# Patient Record
Sex: FEMALE | Race: WHITE | NOT HISPANIC OR LATINO | Employment: FULL TIME | URBAN - METROPOLITAN AREA
[De-identification: names, ages, dates, MRNs, and addresses within clinical notes are randomized per-mention and may not be internally consistent; named-entity substitution may affect disease eponyms.]

---

## 2017-07-18 ENCOUNTER — HOSPITAL ENCOUNTER (OUTPATIENT)
Dept: OTHER | Facility: HOSPITAL | Age: 32
Discharge: HOME | End: 2017-07-18
Attending: NURSE PRACTITIONER | Admitting: NURSE PRACTITIONER

## 2018-05-24 ENCOUNTER — CONVERSION ENCOUNTER (OUTPATIENT)
Dept: FAMILY MEDICINE CLINIC | Facility: CLINIC | Age: 33
End: 2018-05-24

## 2018-05-24 LAB
BASOPHILS # BLD AUTO: NORMAL 10*3/MM3 (ref 0–200)
BASOPHILS NFR BLD AUTO: 0.5 %
CONV NEUTROPHILS/100 LEUKOCYTES IN BODY FLUID BY MANUAL COUNT: 63.4 %
CRP SERPL-MCNC: 0.32 MG/DL
DSDNA AB SER-ACNC: 1 [IU]/ML
EOSINOPHIL # BLD AUTO: 0.7 %
EOSINOPHIL # BLD AUTO: NORMAL 10*3/MM3 (ref 15–500)
ERYTHROCYTE [DISTWIDTH] IN BLOOD BY AUTOMATED COUNT: 12.9 % (ref 11–15)
HCT VFR BLD AUTO: 42.3 % (ref 35–45)
HGB BLD-MCNC: 14.2 G/DL (ref 11.7–15.5)
LYMPHOCYTES # BLD AUTO: NORMAL 10*3/MM3 (ref 850–3900)
LYMPHOCYTES NFR BLD AUTO: 28.6 %
MCH RBC QN AUTO: 29.3 PG (ref 27–33)
MCHC RBC AUTO-ENTMCNC: NORMAL % (ref 32–36)
MCV RBC AUTO: 87.4 FL (ref 80–100)
MONOCYTES # BLD AUTO: NORMAL 10*3/MICROLITER (ref 200–950)
MONOCYTES NFR BLD AUTO: 6.8 %
NEUTROPHILS # BLD AUTO: NORMAL 10*3/MM3 (ref 1500–7800)
PLATELET # BLD AUTO: NORMAL 10*3/MM3 (ref 140–400)
PMV BLD AUTO: 11.3 FL (ref 7.5–12.5)
RBC # BLD AUTO: NORMAL 10*6/MM3 (ref 3.8–5.1)
WBC # BLD AUTO: NORMAL K/UL (ref 3.8–10.8)

## 2018-11-08 ENCOUNTER — HOSPITAL ENCOUNTER (OUTPATIENT)
Dept: OTHER | Facility: HOSPITAL | Age: 33
Discharge: HOME | End: 2018-11-08
Attending: NURSE PRACTITIONER | Admitting: NURSE PRACTITIONER

## 2018-11-08 LAB
ALBUMIN SERPL-MCNC: 4.8 G/DL (ref 3.6–5.1)
ALBUMIN/GLOB SERPL: NORMAL {RATIO} (ref 1–2.5)
ALP SERPL-CCNC: 41 UNITS/L (ref 33–115)
ALT SERPL-CCNC: 7 UNITS/L (ref 6–29)
AST SERPL-CCNC: 12 UNITS/L (ref 10–30)
BASOPHILS # BLD AUTO: NORMAL 10*3/MM3 (ref 0–200)
BASOPHILS NFR BLD AUTO: 0.6 %
BILIRUB SERPL-MCNC: 1.1 MG/DL (ref 0.2–1.2)
BUN SERPL-MCNC: 10 MG/DL (ref 7–25)
BUN/CREAT SERPL: NORMAL (ref 6–22)
CALCIUM SERPL-MCNC: 9.9 MG/DL (ref 8.6–10.2)
CHLORIDE SERPL-SCNC: 106 MMOL/L (ref 98–110)
CO2 CONTENT VENOUS: 28 MMOL/L (ref 20–32)
CONV NEUTROPHILS/100 LEUKOCYTES IN BODY FLUID BY MANUAL COUNT: 60.1 %
CONV RF TITER: <14
CONV TOTAL PROTEIN: 7.3 G/DL (ref 6.1–8.1)
CREAT UR-MCNC: 0.58 MG/DL (ref 0.5–1.1)
CRP SERPL-MCNC: 0.05 MG/DL
EOSINOPHIL # BLD AUTO: 0.7 %
EOSINOPHIL # BLD AUTO: NORMAL 10*3/MM3 (ref 15–500)
ERYTHROCYTE [DISTWIDTH] IN BLOOD BY AUTOMATED COUNT: 12.6 % (ref 11–15)
ERYTHROCYTE [SEDIMENTATION RATE] IN BLOOD BY WESTERGREN METHOD: 2 MM/HR
GLOBULIN UR ELPH-MCNC: NORMAL G/DL (ref 1.9–3.7)
GLUCOSE SERPL-MCNC: 84 MG/DL (ref 65–99)
HCT VFR BLD AUTO: 41.3 % (ref 35–45)
HGB BLD-MCNC: 14 G/DL (ref 11.7–15.5)
LYMPHOCYTES # BLD AUTO: NORMAL 10*3/MM3 (ref 850–3900)
LYMPHOCYTES NFR BLD AUTO: 32.1 %
MCH RBC QN AUTO: 29.7 PG (ref 27–33)
MCHC RBC AUTO-ENTMCNC: NORMAL % (ref 32–36)
MCV RBC AUTO: 87.5 FL (ref 80–100)
MONOCYTES # BLD AUTO: NORMAL 10*3/MICROLITER (ref 200–950)
MONOCYTES NFR BLD AUTO: 6.5 %
NEUTROPHILS # BLD AUTO: NORMAL 10*3/MM3 (ref 1500–7800)
PLATELET # BLD AUTO: NORMAL 10*3/MM3 (ref 140–400)
PMV BLD AUTO: 11.8 FL (ref 7.5–12.5)
POTASSIUM SERPL-SCNC: 4.1 MMOL/L (ref 3.5–5.3)
RBC # BLD AUTO: NORMAL 10*6/MM3 (ref 3.8–5.1)
SODIUM SERPL-SCNC: 138 MMOL/L (ref 135–146)
TSH SERPL-ACNC: 1.01 MICROINTL UNITS/ML
WBC # BLD AUTO: NORMAL K/UL (ref 3.8–10.8)

## 2019-06-24 RX ORDER — ESCITALOPRAM OXALATE 10 MG/1
TABLET ORAL
Qty: 30 TABLET | Refills: 0 | Status: SHIPPED | OUTPATIENT
Start: 2019-06-24 | End: 2019-09-13 | Stop reason: SDUPTHER

## 2019-09-12 PROBLEM — F41.9 ANXIETY: Status: ACTIVE | Noted: 2019-09-12

## 2019-09-12 PROBLEM — R76.8 ELEVATED ANTINUCLEAR ANTIBODY (ANA) LEVEL: Status: ACTIVE | Noted: 2018-11-08

## 2019-09-12 PROBLEM — G62.9 PERIPHERAL NEUROPATHY: Status: ACTIVE | Noted: 2017-07-18

## 2019-09-12 PROBLEM — F17.200 TOBACCO USE DISORDER: Status: ACTIVE | Noted: 2019-09-12

## 2019-09-12 RX ORDER — CITALOPRAM 40 MG/1
TABLET ORAL DAILY
COMMUNITY
Start: 2015-03-23 | End: 2019-09-13

## 2019-09-12 RX ORDER — ALPRAZOLAM 0.25 MG/1
TABLET ORAL
COMMUNITY
Start: 2014-06-18 | End: 2019-09-13

## 2019-09-13 ENCOUNTER — OFFICE VISIT (OUTPATIENT)
Dept: FAMILY MEDICINE CLINIC | Facility: CLINIC | Age: 34
End: 2019-09-13

## 2019-09-13 VITALS
DIASTOLIC BLOOD PRESSURE: 76 MMHG | WEIGHT: 147.4 LBS | TEMPERATURE: 98.4 F | HEART RATE: 74 BPM | HEIGHT: 67 IN | BODY MASS INDEX: 23.13 KG/M2 | SYSTOLIC BLOOD PRESSURE: 119 MMHG | RESPIRATION RATE: 18 BRPM | OXYGEN SATURATION: 99 %

## 2019-09-13 DIAGNOSIS — F41.9 ANXIETY: ICD-10-CM

## 2019-09-13 DIAGNOSIS — E01.0 THYROMEGALY: ICD-10-CM

## 2019-09-13 DIAGNOSIS — N92.6 MENSTRUAL IRREGULARITY: Primary | ICD-10-CM

## 2019-09-13 PROCEDURE — 99214 OFFICE O/P EST MOD 30 MIN: CPT | Performed by: NURSE PRACTITIONER

## 2019-09-13 RX ORDER — ESCITALOPRAM OXALATE 10 MG/1
10 TABLET ORAL DAILY
Qty: 90 TABLET | Refills: 0 | Status: SHIPPED | OUTPATIENT
Start: 2019-09-13 | End: 2019-12-03 | Stop reason: SDUPTHER

## 2019-09-13 RX ORDER — BUSPIRONE HYDROCHLORIDE 5 MG/1
5 TABLET ORAL 3 TIMES DAILY PRN
Qty: 90 TABLET | Refills: 0 | Status: SHIPPED | OUTPATIENT
Start: 2019-09-13 | End: 2019-12-03 | Stop reason: SDUPTHER

## 2019-09-13 NOTE — PROGRESS NOTES
"Chief Complaint   Patient presents with   • Menstrual Problem     having multiple periods a month.    • Depression        Subjective     Wanda Doe  has a past medical history of Bipolar 2 disorder (CMS/HCC), Chronic constipation, Generalized anxiety disorder, Genital herpes, Genital warts, Gestational diabetes, HPV in female, Hyperbilirubinemia, LGSIL on Pap smear of cervix, and Migraine headache.    HPI  Having irregular periods. Has 7 days of bleeding, 4-5 days off, then another 8 days of bleeding, then 7 days off then will start this over again. This has been happening since June 2019. Occasional light spotting on the off days. The days she is bleeding it is like a regular period - her normal flow. Has irritability, stress, breast tenderness, cramping, \"PMS\" symptoms prior to bleeding. Rarely passes a clot.  Prior to June had a normal monthly period that lasted 5-7 days.     Having occasional anxiety & depressive symptoms. She wonders if it is just because she is feeling stressed from her menstrual irregularity. She has been on Buspar in the past and it helped, would like to try it again if possible. Has been on Lexapro since May 2019 - it has helped. Has missed a dose occasionally.     PHQ-2 Depression Screening  Little interest or pleasure in doing things? 3   Feeling down, depressed, or hopeless? 3   PHQ-2 Total Score 17   PHQ-9 Depression Screening  Little interest or pleasure in doing things? 3   Feeling down, depressed, or hopeless? 3   Trouble falling or staying asleep, or sleeping too much? 3   Feeling tired or having little energy? 3   Poor appetite or overeating? 0   Feeling bad about yourself - or that you are a failure or have let yourself or your family down? 0   Trouble concentrating on things, such as reading the newspaper or watching television? 3   Moving or speaking so slowly that other people could have noticed? Or the opposite - being so fidgety or restless that you have been moving " around a lot more than usual? 2   Thoughts that you would be better off dead, or of hurting yourself in some way? 0   PHQ-9 Total Score 17   If you checked off any problems, how difficult have these problems made it for you to do your work, take care of things at home, or get along with other people? Extremely dIfficult     No Known Allergies      Current Outpatient Medications:   •  escitalopram (LEXAPRO) 10 MG tablet, Take 1 tablet by mouth Daily., Disp: 90 tablet, Rfl: 0  •  linaclotide (LINZESS) 145 MCG capsule capsule, LINZESS 145 MCG CAPS, Disp: , Rfl:   •  busPIRone (BUSPAR) 5 MG tablet, Take 1 tablet by mouth 3 (Three) Times a Day As Needed (Anxiety)., Disp: 90 tablet, Rfl: 0    Past Medical History:   Diagnosis Date   • Bipolar 2 disorder (CMS/HCC)    • Chronic constipation    • Generalized anxiety disorder    • Genital herpes    • Genital warts    • Gestational diabetes    • HPV in female    • Hyperbilirubinemia    • LGSIL on Pap smear of cervix    • Migraine headache        Past Surgical History:   Procedure Laterality Date   • COLPOSCOPY     • DILATATION AND CURETTAGE         Social History     Socioeconomic History   • Marital status:      Spouse name: Not on file   • Number of children: Not on file   • Years of education: Not on file   • Highest education level: Not on file   Tobacco Use   • Smoking status: Former Smoker   • Smokeless tobacco: Never Used   Substance and Sexual Activity   • Alcohol use: No     Frequency: Never   • Drug use: No       History reviewed. No pertinent family history.    Family history, surgical history, past medical history, Allergies and med's reviewed with patient today and updated in [facility] EMR.     ROS:  Review of Systems   Constitutional: Positive for fatigue. Negative for activity change, appetite change, diaphoresis, unexpected weight gain and unexpected weight loss.   Eyes: Negative for blurred vision and visual disturbance.   Respiratory: Negative for cough,  "shortness of breath and wheezing.    Cardiovascular: Negative for chest pain, palpitations and leg swelling.   Gastrointestinal: Negative for abdominal pain, constipation, diarrhea, nausea and vomiting.   Endocrine: Negative for cold intolerance and heat intolerance.   Genitourinary: Positive for menstrual problem. Negative for frequency.   Musculoskeletal: Negative for myalgias.   Neurological: Positive for headache. Negative for dizziness and syncope.   Psychiatric/Behavioral: Positive for depressed mood. Negative for self-injury and suicidal ideas.       OBJECTIVE:  Vitals:    09/13/19 0822   BP: 119/76   BP Location: Right arm   Patient Position: Sitting   Cuff Size: Adult   Pulse: 74   Resp: 18   Temp: 98.4 °F (36.9 °C)   TempSrc: Oral   SpO2: 99%   Weight: 66.9 kg (147 lb 6.4 oz)   Height: 169.5 cm (66.75\")     Body mass index is 23.26 kg/m².  Patient's last menstrual period was 09/05/2019.    Physical Exam   Constitutional: She is oriented to person, place, and time. She appears well-developed and well-nourished.   Neck: Trachea normal and normal range of motion. Neck supple. Carotid bruit is not present. Thyromegaly present.   Cardiovascular: Normal rate, regular rhythm, normal heart sounds and intact distal pulses. Exam reveals no gallop and no friction rub.   No murmur heard.  Pulmonary/Chest: Effort normal and breath sounds normal. She has no wheezes. She has no rales.   Abdominal: Soft. Bowel sounds are normal. There is no hepatosplenomegaly. No hernia.   Genitourinary: Rectum normal, vagina normal and uterus normal. Pelvic exam was performed with patient prone. There is no rash, tenderness, lesion or injury on the right labia. There is no rash, tenderness, lesion or injury on the left labia. Cervix exhibits no motion tenderness and no discharge. Right adnexum displays no mass, no tenderness and no fullness. Left adnexum displays no mass, no tenderness and no fullness.   Genitourinary Comments: Scant " amount of brown discharge   Musculoskeletal: Normal range of motion. She exhibits no edema.   Lymphadenopathy:     She has no cervical adenopathy. No inguinal adenopathy noted on the right or left side.   Neurological: She is alert and oriented to person, place, and time.   Skin: Skin is warm and dry.   Psychiatric: She has a normal mood and affect. Her behavior is normal. Judgment and thought content normal.       No visits with results within 30 Day(s) from this visit.   Latest known visit with results is:   Conversion Encounter on 05/24/2018   Component Date Value Ref Range Status   • ds DNA Antibody 05/24/2018 1   Final   • Basophils Absolute 05/24/2018 28 CELLS/UL  0 - 200 10*3/mm3 Final   • Basophil Rel % 05/24/2018 0.5  % Final   • C-Reactive Protein 05/24/2018 0.32  Units converted. See lab report for original value. mg/dL Final   • Eosinophils Absolute 05/24/2018 39 CELLS/UL  15 - 500 10*3/mm3 Final   • Eosinophil Rel % 05/24/2018 0.7  % Final   • Hematocrit 05/24/2018 42.3  35.0 - 45.0 % Final   • Hemoglobin 05/24/2018 14.2  11.7 - 15.5 g/dL Final   • Lymphocytes Absolute 05/24/2018 1573 CELLS/UL  850 - 3900 10*3/mm3 Final   • Lymphocyte Rel % 05/24/2018 28.6  % Final   • MCH 05/24/2018 29.3  27.0 - 33.0 pg Final   • MCHC 05/24/2018 33.6 G/DL  32.0 - 36.0 % Final   • MCV 05/24/2018 87.4  80.0 - 100.0 fL Final   • Monocyte Rel % 05/24/2018 6.8  % Final   • Monocytes Absolute 05/24/2018 374 CELLS/UL  200 - 950 10*3/microliter Final   • MPV 05/24/2018 11.3  7.5 - 12.5 fL Final   • Neutrophils Absolute 05/24/2018 3487 CELLS/UL  1500 - 7800 10*3/mm3 Final   • Platelets 05/24/2018 210 THOUSAND/UL  140 - 400 10*3/mm3 Final   • Neutrophils, Fluid 05/24/2018 63.4  % Final   • RBC 05/24/2018 4.84 MILLION/UL  3.80 - 5.10 10*6/mm3 Final   • RDW 05/24/2018 12.9  11.0 - 15.0 % Final   • WBC 05/24/2018 5.5 THOUSAND/UL  3.8 - 10.8 K/uL Final   • Albumin 11/08/2018 4.8  3.6 - 5.1 g/dL Final   • Alkaline Phosphatase  11/08/2018 41  33 - 115 units/L Final   • Basophils Absolute 11/08/2018 44 CELLS/UL  0 - 200 10*3/mm3 Final   • Basophil Rel % 11/08/2018 0.6  % Final   • Total Bilirubin 11/08/2018 1.1  0.2 - 1.2 mg/dL Final   • BUN 11/08/2018 10  7 - 25 mg/dL Final   • BUN/Creatinine Ratio 11/08/2018 NOT APPLICABLE (calc)  6 - 22 Final   • Calcium 11/08/2018 9.9  8.6 - 10.2 mg/dL Final   • Chloride 11/08/2018 106  98 - 110 mmol/L Final   • CO2 CONTENT  11/08/2018 28  20 - 32 mmol/L Final   • Creatinine 11/08/2018 0.58  0.50 - 1.10 mg/dL Final   • C-Reactive Protein 11/08/2018 0.05  Units converted. See lab report for original value. mg/dL Final   • eGFR African Am 11/08/2018 122  > OR = 60 mL/min/1.73m2 Final   • eGFR Non  Am 11/08/2018 141  > OR = 60 mL/min/1.73m2 Final   • Eosinophils Absolute 11/08/2018 51 CELLS/UL  15 - 500 10*3/mm3 Final   • Eosinophil Rel % 11/08/2018 0.7  % Final   • Sed Rate 11/08/2018 2  < OR = 20 mm/hr Final   • Globulin 11/08/2018 2.5 G/DL (CALC)  1.9 - 3.7 g/dL Final   • Glucose 11/08/2018 84  65 - 99 mg/dL Final   • Hematocrit 11/08/2018 41.3  35.0 - 45.0 % Final   • Hemoglobin 11/08/2018 14.0  11.7 - 15.5 g/dL Final   • Lymphocytes Absolute 11/08/2018 2343 CELLS/UL  850 - 3900 10*3/mm3 Final   • Lymphocyte Rel % 11/08/2018 32.1  % Final   • MCH 11/08/2018 29.7  27.0 - 33.0 pg Final   • MCHC 11/08/2018 33.9 G/DL  32.0 - 36.0 % Final   • MCV 11/08/2018 87.5  80.0 - 100.0 fL Final   • Monocyte Rel % 11/08/2018 6.5  % Final   • Monocytes Absolute 11/08/2018 475 CELLS/UL  200 - 950 10*3/microliter Final   • MPV 11/08/2018 11.8  7.5 - 12.5 fL Final   • Neutrophils Absolute 11/08/2018 4387 CELLS/UL  1500 - 7800 10*3/mm3 Final   • Platelets 11/08/2018 206 THOUSAND/UL  140 - 400 10*3/mm3 Final   • Neutrophils, Fluid 11/08/2018 60.1  % Final   • Potassium 11/08/2018 4.1  3.5 - 5.3 mmol/L Final   • Total Protein 11/08/2018 7.3  6.1 - 8.1 g/dL Final   • RBC 11/08/2018 4.72 MILLION/UL  3.80 - 5.10 10*6/mm3  Final   • RDW 11/08/2018 12.6  11.0 - 15.0 % Final   • RF Titer 11/08/2018 <14  <14 Final   • AST (SGOT) 11/08/2018 12  10 - 30 units/L Final   • ALT (SGPT) 11/08/2018 7  6 - 29 units/L Final   • Sodium 11/08/2018 138  135 - 146 mmol/L Final   • TSH 11/08/2018 1.01  microintl units/mL Final   • WBC 11/08/2018 7.3 THOUSAND/UL  3.8 - 10.8 K/uL Final   • A/G Ratio 11/08/2018 1.9 (calc)  1.0 - 2.5 Final       ASSESSMENT/ PLAN:    Diagnoses and all orders for this visit:    1. Menstrual irregularity (Primary)  -     CBC & Differential  -     TSH  -     Prolactin  -     hCG, Serum, Qualitative  -     Chlamydia trachomatis, Neisseria gonorrhoeae, PCR - , Cervix    2. Thyromegaly  -     US Thyroid; Future    3. Anxiety  -     busPIRone (BUSPAR) 5 MG tablet; Take 1 tablet by mouth 3 (Three) Times a Day As Needed (Anxiety).  Dispense: 90 tablet; Refill: 0    Other orders  -     escitalopram (LEXAPRO) 10 MG tablet; Take 1 tablet by mouth Daily.  Dispense: 90 tablet; Refill: 0    Will call with test results and further recommendations. May need pelvic/transvaginal u/s if these tests are unrevealing.   Follow-up on anxiety in 1 month.     Orders Placed Today:     New Medications Ordered This Visit   Medications   • busPIRone (BUSPAR) 5 MG tablet     Sig: Take 1 tablet by mouth 3 (Three) Times a Day As Needed (Anxiety).     Dispense:  90 tablet     Refill:  0   • escitalopram (LEXAPRO) 10 MG tablet     Sig: Take 1 tablet by mouth Daily.     Dispense:  90 tablet     Refill:  0        Management Plan:     An After Visit Summary was printed and given to the patient at discharge.    Follow-up: No Follow-up on file.    Daniela Duarte, ESTHELA 9/13/2019 8:55 AM  This note was electronically signed.

## 2019-09-14 LAB
B-HCG SERPL QL: NEGATIVE MIU/ML
BASOPHILS # BLD AUTO: 0 X10E3/UL (ref 0–0.2)
BASOPHILS NFR BLD AUTO: 0 %
EOSINOPHIL # BLD AUTO: 0.1 X10E3/UL (ref 0–0.4)
EOSINOPHIL NFR BLD AUTO: 1 %
ERYTHROCYTE [DISTWIDTH] IN BLOOD BY AUTOMATED COUNT: 13.8 % (ref 12.3–15.4)
HCT VFR BLD AUTO: 41.3 % (ref 34–46.6)
HGB BLD-MCNC: 13.9 G/DL (ref 11.1–15.9)
IMM GRANULOCYTES # BLD AUTO: 0 X10E3/UL (ref 0–0.1)
IMM GRANULOCYTES NFR BLD AUTO: 0 %
LYMPHOCYTES # BLD AUTO: 1.8 X10E3/UL (ref 0.7–3.1)
LYMPHOCYTES NFR BLD AUTO: 26 %
MCH RBC QN AUTO: 29 PG (ref 26.6–33)
MCHC RBC AUTO-ENTMCNC: 33.7 G/DL (ref 31.5–35.7)
MCV RBC AUTO: 86 FL (ref 79–97)
MONOCYTES # BLD AUTO: 0.5 X10E3/UL (ref 0.1–0.9)
MONOCYTES NFR BLD AUTO: 7 %
NEUTROPHILS # BLD AUTO: 4.6 X10E3/UL (ref 1.4–7)
NEUTROPHILS NFR BLD AUTO: 66 %
PLATELET # BLD AUTO: 203 X10E3/UL (ref 150–450)
PROLACTIN SERPL-MCNC: 6.1 NG/ML (ref 4.8–23.3)
RBC # BLD AUTO: 4.79 X10E6/UL (ref 3.77–5.28)
TSH SERPL DL<=0.005 MIU/L-ACNC: 1.88 UIU/ML (ref 0.45–4.5)
WBC # BLD AUTO: 7 X10E3/UL (ref 3.4–10.8)

## 2019-09-16 DIAGNOSIS — N92.6 MENSTRUAL IRREGULARITY: Primary | ICD-10-CM

## 2019-09-17 ENCOUNTER — HOSPITAL ENCOUNTER (OUTPATIENT)
Dept: ULTRASOUND IMAGING | Facility: HOSPITAL | Age: 34
Discharge: HOME | End: 2019-09-17

## 2019-09-17 ENCOUNTER — HOSPITAL ENCOUNTER (OUTPATIENT)
Dept: ULTRASOUND IMAGING | Facility: HOSPITAL | Age: 34
Discharge: HOME | End: 2019-09-17
Admitting: FAMILY MEDICINE

## 2019-09-17 DIAGNOSIS — E01.0 THYROMEGALY: ICD-10-CM

## 2019-09-17 DIAGNOSIS — N92.6 MENSTRUAL IRREGULARITY: ICD-10-CM

## 2019-09-17 PROBLEM — E04.1 THYROID CYST: Status: ACTIVE | Noted: 2019-09-17

## 2019-09-17 LAB
C TRACH RRNA SPEC QL NAA+PROBE: NEGATIVE
N GONORRHOEA RRNA SPEC QL NAA+PROBE: NEGATIVE

## 2019-09-17 PROCEDURE — 76856 US EXAM PELVIC COMPLETE: CPT

## 2019-09-17 PROCEDURE — 76830 TRANSVAGINAL US NON-OB: CPT

## 2019-09-17 PROCEDURE — 76536 US EXAM OF HEAD AND NECK: CPT

## 2019-09-18 PROBLEM — D25.1 INTRAMURAL LEIOMYOMA OF UTERUS: Status: ACTIVE | Noted: 2019-09-18

## 2019-09-25 ENCOUNTER — TELEPHONE (OUTPATIENT)
Dept: FAMILY MEDICINE CLINIC | Facility: CLINIC | Age: 34
End: 2019-09-25

## 2019-09-25 NOTE — TELEPHONE ENCOUNTER
She is not taking it 3 times a day, she was taking it twice a day. She feels like she is just more irritated now then she was. She doesn't think it is helping at all.

## 2019-09-25 NOTE — TELEPHONE ENCOUNTER
She can try taking it 3 times a day or schedule an appointment to discuss other treatment options.

## 2019-09-25 NOTE — TELEPHONE ENCOUNTER
Patient says that the Buspirone is not helping at all. Wanting to know if you can put her on something different or if you want to see her before? Please advise.

## 2019-10-10 RX ORDER — LINACLOTIDE 145 UG/1
CAPSULE, GELATIN COATED ORAL
Qty: 30 CAPSULE | Refills: 0 | Status: SHIPPED | OUTPATIENT
Start: 2019-10-10 | End: 2020-01-24

## 2019-11-01 DIAGNOSIS — F41.9 ANXIETY: ICD-10-CM

## 2019-11-02 RX ORDER — BUSPIRONE HYDROCHLORIDE 5 MG/1
TABLET ORAL
Qty: 90 TABLET | Refills: 0 | OUTPATIENT
Start: 2019-11-02

## 2019-12-02 DIAGNOSIS — F41.9 ANXIETY: ICD-10-CM

## 2019-12-02 RX ORDER — BUSPIRONE HYDROCHLORIDE 5 MG/1
5 TABLET ORAL 3 TIMES DAILY PRN
Qty: 90 TABLET | Refills: 0 | OUTPATIENT
Start: 2019-12-02

## 2019-12-03 DIAGNOSIS — F41.9 ANXIETY: ICD-10-CM

## 2019-12-03 RX ORDER — ESCITALOPRAM OXALATE 10 MG/1
10 TABLET ORAL DAILY
Qty: 30 TABLET | Refills: 0 | Status: SHIPPED | OUTPATIENT
Start: 2019-12-03 | End: 2019-12-31

## 2019-12-03 RX ORDER — BUSPIRONE HYDROCHLORIDE 5 MG/1
5 TABLET ORAL 3 TIMES DAILY PRN
Qty: 60 TABLET | Refills: 0 | Status: SHIPPED | OUTPATIENT
Start: 2019-12-03 | End: 2020-01-08

## 2019-12-03 NOTE — TELEPHONE ENCOUNTER
I sent in a refill on her meds, but she needs to schedule a follow-up appointment. I made changes to her medicines at her September appointment and she was supposed to follow-up a month later.

## 2019-12-03 NOTE — TELEPHONE ENCOUNTER
Patient says she is out of both of the following medications and says she cant wait until the end of the month (which is when your first available appt is). She says she is off today and come in if we have anything (we do not). Please advise.

## 2019-12-06 ENCOUNTER — TELEPHONE (OUTPATIENT)
Dept: FAMILY MEDICINE CLINIC | Facility: CLINIC | Age: 34
End: 2019-12-06

## 2019-12-06 NOTE — TELEPHONE ENCOUNTER
Patient went to Urgent care for her knee and they said it was out of place. She says its now back in place but it keeps popping. They put her in a splint but didn't tell her how long she should wear it. When she takes it off it starts popping. She is scheduled to follow up here in a few weeks but that's the soonest we could get her in. She is just needing to know what else to do and when she can take the splint off.

## 2019-12-09 ENCOUNTER — TELEPHONE (OUTPATIENT)
Dept: FAMILY MEDICINE CLINIC | Facility: CLINIC | Age: 34
End: 2019-12-09

## 2019-12-09 NOTE — TELEPHONE ENCOUNTER
Patient is wanting to know if it is okay for her to stand all day while wearing a splint? Okay to resume normal activities?

## 2019-12-17 ENCOUNTER — TELEPHONE (OUTPATIENT)
Dept: FAMILY MEDICINE CLINIC | Facility: CLINIC | Age: 34
End: 2019-12-17

## 2019-12-17 NOTE — TELEPHONE ENCOUNTER
Patient is needing to follow up with Dr. Mcnulty from the ER for her knee pain. She called to schedule and they need a referral from you. Okay to do referral?   6

## 2019-12-17 NOTE — TELEPHONE ENCOUNTER
Patient says she is still wearing her brace for her knee but took it off for a little bit earlier and her knee has been popping and grinding. She stands all day at work and feels like this isn't helping it. She works tomorrow and doesn't know what to do. She has an appt with you on 12/26/2019.

## 2019-12-18 DIAGNOSIS — S83.105A DISLOCATION OF LEFT KNEE, INITIAL ENCOUNTER: Primary | ICD-10-CM

## 2019-12-18 NOTE — TELEPHONE ENCOUNTER
I saw her outside the office today and she said that she went to the ER and was referred to Ortho. So this has been taken care of.

## 2019-12-26 ENCOUNTER — OFFICE VISIT (OUTPATIENT)
Dept: FAMILY MEDICINE CLINIC | Facility: CLINIC | Age: 34
End: 2019-12-26

## 2019-12-26 VITALS
TEMPERATURE: 98.1 F | HEIGHT: 67 IN | RESPIRATION RATE: 18 BRPM | DIASTOLIC BLOOD PRESSURE: 79 MMHG | OXYGEN SATURATION: 97 % | HEART RATE: 94 BPM | BODY MASS INDEX: 23.26 KG/M2 | SYSTOLIC BLOOD PRESSURE: 115 MMHG

## 2019-12-26 DIAGNOSIS — J01.10 ACUTE NON-RECURRENT FRONTAL SINUSITIS: Primary | ICD-10-CM

## 2019-12-26 PROCEDURE — 99213 OFFICE O/P EST LOW 20 MIN: CPT | Performed by: FAMILY MEDICINE

## 2019-12-26 RX ORDER — IBUPROFEN 800 MG/1
TABLET ORAL
COMMUNITY
Start: 2019-12-04 | End: 2020-01-24

## 2019-12-26 RX ORDER — BENZONATATE 100 MG/1
CAPSULE ORAL
COMMUNITY
Start: 2019-12-23 | End: 2019-12-27

## 2019-12-26 RX ORDER — AMOXICILLIN AND CLAVULANATE POTASSIUM 875; 125 MG/1; MG/1
1 TABLET, FILM COATED ORAL 2 TIMES DAILY
Qty: 20 TABLET | Refills: 0 | Status: SHIPPED | OUTPATIENT
Start: 2019-12-26 | End: 2020-01-08

## 2019-12-26 RX ORDER — TRAMADOL HYDROCHLORIDE 50 MG/1
TABLET ORAL
COMMUNITY
Start: 2019-12-17 | End: 2019-12-27

## 2019-12-26 NOTE — ASSESSMENT & PLAN NOTE
Increase fluids. Tylenol and Advil prn. Report worsening or persistence of symptoms.  Increase fluids. Tylenol and Advil prn. Report worsening or persistence of symptoms. Complete course of medications. Adverse effects discussed. Consider use of probiotics to limit GI side-effects.

## 2019-12-26 NOTE — PROGRESS NOTES
"Patient presents with symptoms of upper respiratory infection for 10 days that are moderate in nature. Symptoms include sore throat, post nasal drip, coryza, non productive cough and low grade fevers. Patient denies sneezing. Symptoms are aggravated by weather changes and exposure to someone with similar illness. Patient has tried multi-symptom OTC cold medication(s).     Past Medical History:   Diagnosis Date   • Bipolar 2 disorder (CMS/HCC)    • Chronic constipation    • Generalized anxiety disorder    • Genital herpes    • Genital warts    • Gestational diabetes    • HPV in female    • Hyperbilirubinemia    • LGSIL on Pap smear of cervix    • Migraine headache      Past Surgical History:   Procedure Laterality Date   • COLPOSCOPY     • DILATATION AND CURETTAGE       Family History   Problem Relation Age of Onset   • Cancer Other    • Diabetes Other      Social History     Tobacco Use   • Smoking status: Former Smoker   • Smokeless tobacco: Never Used   Substance Use Topics   • Alcohol use: No     Frequency: Never       Review of Systems   Constitutional: Negative for fatigue.   HENT: Positive for congestion, postnasal drip, rhinorrhea, sinus pressure, sneezing, sore throat and swollen glands.    Respiratory: Positive for cough and shortness of breath.    Cardiovascular: Positive for chest pain and palpitations.   Gastrointestinal: Negative for abdominal pain, diarrhea, nausea, vomiting, GERD and indigestion.   Neurological: Negative for headache.     Vitals:    12/26/19 1346   BP: 115/79   BP Location: Left arm   Patient Position: Sitting   Cuff Size: Adult   Pulse: 94   Resp: 18   Temp: 98.1 °F (36.7 °C)   TempSrc: Oral   SpO2: 97%   Height: 169.5 cm (66.75\")     Body mass index is 23.26 kg/m².  Physical Exam   Constitutional: She appears well-developed and well-nourished. No distress.   HENT:   Head: Normocephalic and atraumatic.   Right Ear: Hearing, tympanic membrane, external ear and ear canal normal.   Left " Ear: Hearing, tympanic membrane, external ear and ear canal normal.   Nose: Nose normal.   Mouth/Throat: Uvula is midline, oropharynx is clear and moist and mucous membranes are normal. Tonsils are 0 on the right. Tonsils are 0 on the left. No tonsillar exudate.   Pulmonary/Chest: Effort normal and breath sounds normal.           Diagnoses and all orders for this visit:    1. Acute non-recurrent frontal sinusitis (Primary)  Assessment & Plan:  Increase fluids. Tylenol and Advil prn. Report worsening or persistence of symptoms.  Increase fluids. Tylenol and Advil prn. Report worsening or persistence of symptoms. Complete course of medications. Adverse effects discussed. Consider use of probiotics to limit GI side-effects.        Other orders  -     amoxicillin-clavulanate (AUGMENTIN) 875-125 MG per tablet; Take 1 tablet by mouth 2 (Two) Times a Day.  Dispense: 20 tablet; Refill: 0

## 2019-12-27 ENCOUNTER — OFFICE VISIT (OUTPATIENT)
Dept: ORTHOPEDIC SURGERY | Facility: CLINIC | Age: 34
End: 2019-12-27

## 2019-12-27 VITALS
HEIGHT: 67 IN | SYSTOLIC BLOOD PRESSURE: 105 MMHG | WEIGHT: 150 LBS | HEART RATE: 66 BPM | BODY MASS INDEX: 23.54 KG/M2 | DIASTOLIC BLOOD PRESSURE: 72 MMHG

## 2019-12-27 DIAGNOSIS — S83.005A DISLOCATION OF LEFT PATELLA, INITIAL ENCOUNTER: Primary | ICD-10-CM

## 2019-12-27 PROCEDURE — 99203 OFFICE O/P NEW LOW 30 MIN: CPT | Performed by: FAMILY MEDICINE

## 2019-12-27 NOTE — PROGRESS NOTES
"Primary Care Sports Medicine Office Visit Note     Patient ID: Wanda Doe is a 34 y.o. female.    Chief Complaint:  Chief Complaint   Patient presents with   • Left Knee - Pain     HPI:    Ms. Wanda Doe is a 34 y.o. female who presents to the clinic today for patellar dislocation. Was at work and simply twisted on her knee as she has done many times, and oddly felt her knee cap \"pop\". Looked down and it was located laterally. She forced it back medially with her hand a felt a popping back into popper location. She states she was seen at an Urgent Care, who didn't do much. She was then seen at the ER, who gave her a brace and recommended she preform complete NWB until seeing an orthopedist. This is her first instance of dislocation. She has patellar crepitus now that she feels like is new. Very apprehensive with walking.     Past Medical History:   Diagnosis Date   • Bipolar 2 disorder (CMS/HCC)    • Chronic constipation    • Generalized anxiety disorder    • Genital herpes    • Genital warts    • Gestational diabetes    • HPV in female    • Hyperbilirubinemia    • LGSIL on Pap smear of cervix    • Migraine headache        Past Surgical History:   Procedure Laterality Date   • COLPOSCOPY     • DILATATION AND CURETTAGE         Family History   Problem Relation Age of Onset   • Cancer Other    • Diabetes Other      Social History     Occupational History   • Not on file   Tobacco Use   • Smoking status: Former Smoker     Last attempt to quit: 2017     Years since quittin.9   • Smokeless tobacco: Never Used   Substance and Sexual Activity   • Alcohol use: No     Frequency: Never   • Drug use: No   • Sexual activity: Defer      Review of Systems   Constitutional: Negative for activity change and fever.   Respiratory: Negative for cough and shortness of breath.    Cardiovascular: Negative for chest pain.   Gastrointestinal: Negative for constipation, diarrhea, nausea and vomiting.   Musculoskeletal: " "Positive for arthralgias.   Skin: Negative for color change and rash.   Neurological: Negative for weakness.   Hematological: Does not bruise/bleed easily.       Objective:    /72   Pulse 66   Ht 169.4 cm (66.7\")   Wt 68 kg (150 lb)   BMI 23.71 kg/m²     Physical Examination:  Physical Exam   Constitutional: She appears well-developed and well-nourished. No distress.   HENT:   Head: Normocephalic and atraumatic.   Eyes: Conjunctivae are normal.   Cardiovascular: Intact distal pulses.   Pulmonary/Chest: Effort normal. No respiratory distress.   Musculoskeletal:        Left knee: She exhibits effusion (1+).   Neurological: She is alert.   Skin: Skin is warm. Capillary refill takes less than 2 seconds. She is not diaphoretic.   Nursing note and vitals reviewed.    Left Ankle Exam     Range of Motion   The patient has normal left ankle ROM.       Left Knee Exam     Muscle Strength   The patient has normal left knee strength.    Tenderness   The patient is experiencing tenderness in the medial retinaculum.    Range of Motion   The patient has normal left knee ROM.  Extension: normal   Flexion: normal     Tests   Michelle:  Medial - negative Lateral - negative  Varus: negative Valgus: negative  Lachman:  Anterior - negative      Drawer:  Anterior - negative     Posterior - negative  Patellar apprehension: positive    Other   Erythema: absent  Sensation: normal  Swelling: mild  Effusion: effusion (1+) present      Left Hip Exam     Range of Motion   The patient has normal left hip ROM.          Imaging and other tests:  No new imaging today.     Assessment and Plan:    1. Dislocation of left patella, initial encounter  - Ambulatory Referral to Physical Therapy Evaluate and treat; Stretching, ROM, Strengthening    I discussed with the patient today that being that this is her first instance, I feel she will likely do well conservatively.  No need for an MRI as an evaluation ligamentously seems to be intact other " "than some moderate patellar apprehension.  I recommended that we start physical therapy for quadriceps and VMO strengthening for patellar stabilization.  She was also given a lateral J/Brice pull knee brace for patellar alignment.  She can work with physical therapy for the next 4 weeks, and return at that time for further evaluation.  If at anytime she has any setbacks or other problems we can always order an MRI if warranted.  RTC 1 month.    Barrington DAVIS \"Chance\" Josue ASH, , CAQSM  12/31/19  12:03 PM    Disclaimer: Please note that areas of this note were completed with computer voice recognition software.  Quite often unanticipated grammatical, syntax, homophones, and other interpretive errors are inadvertently transcribed by the computer software. Please excuse any errors that have escaped final proofreading.  "

## 2019-12-31 RX ORDER — ESCITALOPRAM OXALATE 10 MG/1
10 TABLET ORAL DAILY
Qty: 30 TABLET | Refills: 0 | Status: SHIPPED | OUTPATIENT
Start: 2019-12-31 | End: 2020-01-08

## 2020-01-08 ENCOUNTER — OFFICE VISIT (OUTPATIENT)
Dept: FAMILY MEDICINE CLINIC | Facility: CLINIC | Age: 35
End: 2020-01-08

## 2020-01-08 VITALS
RESPIRATION RATE: 16 BRPM | WEIGHT: 162 LBS | HEIGHT: 67 IN | TEMPERATURE: 98 F | BODY MASS INDEX: 25.43 KG/M2 | HEART RATE: 68 BPM | SYSTOLIC BLOOD PRESSURE: 115 MMHG | OXYGEN SATURATION: 100 % | DIASTOLIC BLOOD PRESSURE: 76 MMHG

## 2020-01-08 DIAGNOSIS — F41.8 MIXED ANXIETY DEPRESSIVE DISORDER: Primary | ICD-10-CM

## 2020-01-08 PROBLEM — J01.10 ACUTE NON-RECURRENT FRONTAL SINUSITIS: Status: RESOLVED | Noted: 2019-12-26 | Resolved: 2020-01-08

## 2020-01-08 PROBLEM — F41.9 ANXIETY: Status: RESOLVED | Noted: 2019-09-12 | Resolved: 2020-01-08

## 2020-01-08 PROCEDURE — 99213 OFFICE O/P EST LOW 20 MIN: CPT | Performed by: NURSE PRACTITIONER

## 2020-01-08 NOTE — ASSESSMENT & PLAN NOTE
Will try switching from Lexapro & Buspar to Trintellix alone.   Start Trintellix 10 mg daily x 2 weeks, then increase to 20 mg daily.   Follow-up in 6 weeks, sooner for problems.

## 2020-01-08 NOTE — PROGRESS NOTES
Chief Complaint   Patient presents with   • Depression   • Anxiety        Subjective     Wanda Doe  has a past medical history of Bipolar 2 disorder (CMS/HCC), Chronic constipation, Generalized anxiety disorder, Genital herpes, Genital warts, Gestational diabetes, HPV in female, Hyperbilirubinemia, LGSIL on Pap smear of cervix, and Migraine headache.    HPI  She was seen on 9/13/19 with complaint of:  Having anxiety & depressive symptoms off & on for years. She wondered if it was just because she was feeling stressed from her menstrual irregularity. She had been on Buspar in the past and it helped, and wanted to try it again. Has been on Lexapro since May 2019. She was prescribed Buspar 5 mg TID PRN. Didn't feel like the Buspar was helping - or it might have made her anxiety worse. She stopped taking it a couple of weeks ago. Has also had weight gain (35 pounds) since starting Lexapro. Also had decreased libido. Doesn't feel as depressed. Feels more irritable and overwhelmed.     No Known Allergies      Current Outpatient Medications:   •  LINZESS 145 MCG capsule capsule, TAKE 1 CAPSULE BY MOUTH EVERY DAY ON AN EMPTY STOMACH AT LEAST 30 MINUTES PRIOR TO THE FIRST MEAL OF THE DAY, Disp: 30 capsule, Rfl: 0  •  ibuprofen (ADVIL,MOTRIN) 800 MG tablet, TK 1 T PO Q 8 H FOR 10 DAYS, Disp: , Rfl:   •  Vortioxetine HBr 20 MG tablet, Take 20 mg by mouth Daily., Disp: 30 tablet, Rfl: 1    Past Medical History:   Diagnosis Date   • Bipolar 2 disorder (CMS/HCC)    • Chronic constipation    • Generalized anxiety disorder    • Genital herpes    • Genital warts    • Gestational diabetes    • HPV in female    • Hyperbilirubinemia    • LGSIL on Pap smear of cervix    • Migraine headache        Past Surgical History:   Procedure Laterality Date   • COLPOSCOPY     • DILATATION AND CURETTAGE         Social History     Socioeconomic History   • Marital status:      Spouse name: Not on file   • Number of children: Not on file  "  • Years of education: Not on file   • Highest education level: Not on file   Tobacco Use   • Smoking status: Former Smoker     Last attempt to quit: 2017     Years since quitting: 3.0   • Smokeless tobacco: Never Used   Substance and Sexual Activity   • Alcohol use: No     Frequency: Never   • Drug use: No   • Sexual activity: Defer       Family History   Problem Relation Age of Onset   • Cancer Other    • Diabetes Other        Family history, surgical history, past medical history, Allergies and med's reviewed with patient today and updated in [facility] EMR.     ROS:  Review of Systems   Constitutional: Positive for fatigue. Negative for activity change, appetite change, diaphoresis, unexpected weight gain and unexpected weight loss.   Eyes: Negative for blurred vision and visual disturbance.   Respiratory: Negative for apnea, cough, shortness of breath and wheezing.    Cardiovascular: Negative for chest pain, palpitations and leg swelling.   Gastrointestinal: Negative for abdominal pain, constipation, diarrhea, nausea and vomiting.   Endocrine: Negative for cold intolerance and heat intolerance.   Neurological: Negative for dizziness, syncope and headache.   Psychiatric/Behavioral: Positive for agitation and stress. Negative for depressed mood. The patient is nervous/anxious.        OBJECTIVE:  Vitals:    01/08/20 1326   BP: 115/76   BP Location: Left arm   Patient Position: Sitting   Cuff Size: Adult   Pulse: 68   Resp: 16   Temp: 98 °F (36.7 °C)   TempSrc: Oral   SpO2: 100%   Weight: 73.5 kg (162 lb)   Height: 169.5 cm (66.75\")     Body mass index is 25.56 kg/m².    Physical Exam   Constitutional: She is oriented to person, place, and time. She appears well-developed and well-nourished. No distress.   Musculoskeletal:   Wearing brace on left knee   Neurological: She is alert and oriented to person, place, and time.   Skin: Skin is warm and dry.   Psychiatric: She has a normal mood and affect. Her behavior is " normal. Judgment and thought content normal.       No visits with results within 30 Day(s) from this visit.   Latest known visit with results is:   Office Visit on 09/13/2019   Component Date Value Ref Range Status   • WBC 09/13/2019 7.0  3.4 - 10.8 x10E3/uL Final   • RBC 09/13/2019 4.79  3.77 - 5.28 x10E6/uL Final   • Hemoglobin 09/13/2019 13.9  11.1 - 15.9 g/dL Final   • Hematocrit 09/13/2019 41.3  34.0 - 46.6 % Final   • MCV 09/13/2019 86  79 - 97 fL Final   • MCH 09/13/2019 29.0  26.6 - 33.0 pg Final   • MCHC 09/13/2019 33.7  31.5 - 35.7 g/dL Final   • RDW 09/13/2019 13.8  12.3 - 15.4 % Final   • Platelets 09/13/2019 203  150 - 450 x10E3/uL Final   • Neutrophil Rel % 09/13/2019 66  Not Estab. % Final   • Lymphocyte Rel % 09/13/2019 26  Not Estab. % Final   • Monocyte Rel % 09/13/2019 7  Not Estab. % Final   • Eosinophil Rel % 09/13/2019 1  Not Estab. % Final   • Basophil Rel % 09/13/2019 0  Not Estab. % Final   • Neutrophils Absolute 09/13/2019 4.6  1.4 - 7.0 x10E3/uL Final   • Lymphocytes Absolute 09/13/2019 1.8  0.7 - 3.1 x10E3/uL Final   • Monocytes Absolute 09/13/2019 0.5  0.1 - 0.9 x10E3/uL Final   • Eosinophils Absolute 09/13/2019 0.1  0.0 - 0.4 x10E3/uL Final   • Basophils Absolute 09/13/2019 0.0  0.0 - 0.2 x10E3/uL Final   • Immature Granulocyte Rel % 09/13/2019 0  Not Estab. % Final   • Immature Grans Absolute 09/13/2019 0.0  0.0 - 0.1 x10E3/uL Final   • TSH 09/13/2019 1.880  0.450 - 4.500 uIU/mL Final   • Prolactin 09/13/2019 6.1  4.8 - 23.3 ng/mL Final   • HCG Qualitative 09/13/2019 Negative  Negative <6 mIU/mL Final   • Chlamydia trachomatis, AALIYAH 09/13/2019 Negative  Negative Final   • Neisseria gonorrhoeae, AALIYAH 09/13/2019 Negative  Negative Final       ASSESSMENT/ PLAN:    Diagnoses and all orders for this visit:    1. Mixed anxiety depressive disorder (Primary)  Assessment & Plan:  Will try switching from Lexapro & Buspar to Trintellix alone.   Start Trintellix 10 mg daily x 2 weeks, then increase  to 20 mg daily.   Follow-up in 6 weeks, sooner for problems.       Other orders  -     Vortioxetine HBr 20 MG tablet; Take 20 mg by mouth Daily.  Dispense: 30 tablet; Refill: 1    The majority of this visit was spent in counseling. Time spent: 15 minutes    Orders Placed Today:     New Medications Ordered This Visit   Medications   • Vortioxetine HBr 20 MG tablet     Sig: Take 20 mg by mouth Daily.     Dispense:  30 tablet     Refill:  1        Management Plan:     An After Visit Summary was printed and given to the patient at discharge.    Follow-up: Return in about 6 weeks (around 2/19/2020) for Follow-Up anxiety.    ESTHELA Jewell 1/8/2020 2:11 PM  This note was electronically signed.

## 2020-01-24 ENCOUNTER — OFFICE VISIT (OUTPATIENT)
Dept: ORTHOPEDIC SURGERY | Facility: CLINIC | Age: 35
End: 2020-01-24

## 2020-01-24 VITALS
HEART RATE: 79 BPM | BODY MASS INDEX: 26.2 KG/M2 | WEIGHT: 163 LBS | DIASTOLIC BLOOD PRESSURE: 79 MMHG | SYSTOLIC BLOOD PRESSURE: 111 MMHG | HEIGHT: 66 IN

## 2020-01-24 DIAGNOSIS — M25.362 KNEE INSTABILITY, LEFT: Primary | ICD-10-CM

## 2020-01-24 PROCEDURE — 99213 OFFICE O/P EST LOW 20 MIN: CPT | Performed by: FAMILY MEDICINE

## 2020-01-28 NOTE — PROGRESS NOTES
"Primary Care Sports Medicine Office Visit Note     Patient ID: Wanda Doe is a 34 y.o. female.    Chief Complaint:  Chief Complaint   Patient presents with   • Left Knee - Follow-up     HPI:    Ms. Wanda Doe is a 34 y.o. female who returns clinic today for repeat evaluation status post patellar dislocation.  The patient states about 4 weeks ago when she was first seen, she had a kneecap dislocation event.  She reduced the kneecap herself forcefully by hand.  At that time anterior knee pain and problems were her only complaint, therefore we collectively decided to forego MRI.  She returns today for further evaluation.  Though her knee continues to feel unstable, it is not in the anterior compartment today.  She now describes a different instability, in the lateral compartment.  She has a new popping and clicking in the lateral joint line, and is concerned that her knee is going to \"give out\" laterally now.  She has been wearing her lateral J knee brace, that seems to help with patellar instability significantly.  He is going to physical therapy as instructed and doing well there also.    Past Medical History:   Diagnosis Date   • Bipolar 2 disorder (CMS/HCC)    • Chronic constipation    • Generalized anxiety disorder    • Genital herpes    • Genital warts    • Gestational diabetes    • HPV in female    • Hyperbilirubinemia    • LGSIL on Pap smear of cervix    • Migraine headache        Past Surgical History:   Procedure Laterality Date   • COLPOSCOPY     • DILATATION AND CURETTAGE         Family History   Problem Relation Age of Onset   • Cancer Other    • Diabetes Other      Social History     Occupational History   • Not on file   Tobacco Use   • Smoking status: Former Smoker     Last attempt to quit: 2017     Years since quitting: 3.0   • Smokeless tobacco: Never Used   Substance and Sexual Activity   • Alcohol use: No     Frequency: Never   • Drug use: No   • Sexual activity: Defer      Review of " "Systems   Constitutional: Negative for activity change and fever.   Musculoskeletal: Positive for arthralgias.   Skin: Negative for color change and rash.   Neurological: Negative for weakness.       Objective:    /79 (BP Location: Right arm, Patient Position: Sitting, Cuff Size: Adult)   Pulse 79   Ht 167.6 cm (66\")   Wt 73.9 kg (163 lb)   BMI 26.31 kg/m²     Physical Examination:  Physical Exam   Constitutional: She appears well-developed and well-nourished. No distress.   HENT:   Head: Normocephalic and atraumatic.   Eyes: Conjunctivae are normal.   Cardiovascular: Intact distal pulses.   Pulmonary/Chest: Effort normal. No respiratory distress.   Musculoskeletal:        Right knee: She exhibits no effusion.   Neurological: She is alert.   Skin: Skin is warm. Capillary refill takes less than 2 seconds. She is not diaphoretic.   Nursing note and vitals reviewed.    Right Knee Exam     Muscle Strength   The patient has normal right knee strength.    Tenderness   The patient is experiencing tenderness in the lateral joint line and medial retinaculum.    Range of Motion   Extension: normal   Flexion: normal     Tests   Michelle:  Medial - negative Lateral - positive  Varus: negative Valgus: negative  Lachman:  Anterior - negative      Drawer:  Anterior - negative    Posterior - negative    Other   Erythema: absent  Sensation: normal  Pulse: present  Swelling: mild  Effusion: no effusion present          Imaging and other tests:  No new imaging today.    Assessment and Plan:    1. Knee instability, left  - MRI Knee Left Without Contrast; Future    Initially, the patient had a fairly significant valgus force with medial rotation.  This could be a crunch maneuver for the lateral meniscus.  At first, her only symptomatology was her patellar instability and apprehension.  While this remains today, it is improved significantly.  However she now has some very concerning symptoms for lateral meniscus tear.  I like " "to go ahead and get an MRI to confirm meniscus tear, but we will also evaluate MPFL since were getting imaging.  RTC in 3 to 5 days or status post MRI, for results question.    Barrington DAVIS \"Chance\" Josue ASH DO, CAQSM  01/27/20  8:45 PM    Disclaimer: Please note that areas of this note were completed with computer voice recognition software.  Quite often unanticipated grammatical, syntax, homophones, and other interpretive errors are inadvertently transcribed by the computer software. Please excuse any errors that have escaped final proofreading.  "

## 2020-02-04 ENCOUNTER — APPOINTMENT (OUTPATIENT)
Dept: MRI IMAGING | Facility: HOSPITAL | Age: 35
End: 2020-02-04

## 2020-02-12 ENCOUNTER — TELEPHONE (OUTPATIENT)
Dept: FAMILY MEDICINE CLINIC | Facility: CLINIC | Age: 35
End: 2020-02-12

## 2020-02-12 RX ORDER — FLUCONAZOLE 150 MG/1
150 TABLET ORAL ONCE
Qty: 1 TABLET | Refills: 1 | Status: SHIPPED | OUTPATIENT
Start: 2020-02-12 | End: 2020-02-12

## 2020-02-12 NOTE — TELEPHONE ENCOUNTER
Patient called stating that she has a yeast infection and is requesting diflucan be sent to [facility] for her since it is the only thing that seems to help. Please advise.

## 2020-02-18 RX ORDER — LINACLOTIDE 145 UG/1
CAPSULE, GELATIN COATED ORAL
Qty: 30 CAPSULE | Refills: 0 | Status: SHIPPED | OUTPATIENT
Start: 2020-02-18 | End: 2020-03-04

## 2020-02-19 ENCOUNTER — OFFICE VISIT (OUTPATIENT)
Dept: FAMILY MEDICINE CLINIC | Facility: CLINIC | Age: 35
End: 2020-02-19

## 2020-02-19 VITALS
RESPIRATION RATE: 18 BRPM | SYSTOLIC BLOOD PRESSURE: 112 MMHG | HEART RATE: 78 BPM | DIASTOLIC BLOOD PRESSURE: 75 MMHG | TEMPERATURE: 97.9 F | HEIGHT: 66 IN | BODY MASS INDEX: 26.2 KG/M2 | OXYGEN SATURATION: 96 % | WEIGHT: 163 LBS

## 2020-02-19 DIAGNOSIS — F41.8 MIXED ANXIETY DEPRESSIVE DISORDER: Primary | ICD-10-CM

## 2020-02-19 PROCEDURE — 99213 OFFICE O/P EST LOW 20 MIN: CPT | Performed by: NURSE PRACTITIONER

## 2020-02-19 RX ORDER — FLUCONAZOLE 150 MG/1
TABLET ORAL
COMMUNITY
Start: 2020-02-16 | End: 2020-02-19

## 2020-02-19 NOTE — PROGRESS NOTES
"Chief Complaint   Patient presents with   • Anxiety        Subjective     Wanda Doe  has a past medical history of Bipolar 2 disorder (CMS/HCC), Chronic constipation, Generalized anxiety disorder, Genital herpes, Genital warts, Gestational diabetes, HPV in female, Hyperbilirubinemia, LGSIL on Pap smear of cervix, and Migraine headache.    HPI:  Anxiety & Depression  Patient has been having anxiety & depressive symptoms off & on for years. She wondered if it was just because she was feeling stressed from her menstrual irregularity. She had been on Buspar in the past and it helped, and wanted to try it again. Had been on Lexapro since May 2019. She was prescribed Buspar 5 mg TID PRN. Didn't feel like the Buspar was helping - or it might have made her anxiety worse. She stopped taking it prior to her last appointment. Also had weight gain (35 pounds) on Lexapro. Also had decreased libido. Didnt feel as depressed, but felt more irritable and overwhelmed while on Lexapro. At her January appointment the Lexapro and Buspar were discontinued and Trintellix was started. She took the Trintellix for about a month. She stopped it about 10 days ago because she felt \"too laid back.\" Didn't feel like cleaning her house or doing things she normally would do. She wants to stay off of medication all together for now.      No Known Allergies      Current Outpatient Medications:   •  LINZESS 145 MCG capsule capsule, TAKE ONE CAPSULE BY MOUTH EVERY DAY ON AN EMPTY STOMACH AT LEAST 30 MINUTES PRIOR TO THE FIRST MEAL OF THE DAY, Disp: 30 capsule, Rfl: 0  •  Vortioxetine HBr 20 MG tablet, Take 20 mg by mouth Daily., Disp: 30 tablet, Rfl: 1    Past Medical History:   Diagnosis Date   • Bipolar 2 disorder (CMS/HCC)    • Chronic constipation    • Generalized anxiety disorder    • Genital herpes    • Genital warts    • Gestational diabetes    • HPV in female    • Hyperbilirubinemia    • LGSIL on Pap smear of cervix    • Migraine headache " "       Past Surgical History:   Procedure Laterality Date   • COLPOSCOPY     • DILATATION AND CURETTAGE         Social History     Socioeconomic History   • Marital status:      Spouse name: Not on file   • Number of children: Not on file   • Years of education: Not on file   • Highest education level: Not on file   Tobacco Use   • Smoking status: Former Smoker     Last attempt to quit: 2017     Years since quitting: 3.1   • Smokeless tobacco: Never Used   Substance and Sexual Activity   • Alcohol use: No     Frequency: Never   • Drug use: No   • Sexual activity: Defer       Family History   Problem Relation Age of Onset   • Cancer Other    • Diabetes Other        Family history, surgical history, past medical history, Allergies and med's reviewed with patient today and updated in [facility] EMR.     ROS:  Review of Systems   Constitutional: Positive for fatigue. Negative for activity change, appetite change, diaphoresis, unexpected weight gain and unexpected weight loss.   Eyes: Negative for blurred vision and visual disturbance.   Respiratory: Negative for apnea, cough, shortness of breath and wheezing.    Cardiovascular: Negative for chest pain, palpitations and leg swelling.   Gastrointestinal: Negative for abdominal pain, constipation, diarrhea, nausea and vomiting.   Endocrine: Negative for cold intolerance and heat intolerance.   Neurological: Negative for dizziness, syncope and headache.   Psychiatric/Behavioral: Positive for agitation and stress. Negative for suicidal ideas and depressed mood. The patient is nervous/anxious.        OBJECTIVE:  Vitals:    02/19/20 1616   BP: 112/75   BP Location: Left arm   Patient Position: Sitting   Cuff Size: Adult   Pulse: 78   Resp: 18   Temp: 97.9 °F (36.6 °C)   TempSrc: Oral   SpO2: 96%   Weight: 73.9 kg (163 lb)   Height: 167.6 cm (66\")     Body mass index is 26.31 kg/m².  Patient's last menstrual period was 02/18/2020.    Physical Exam   Constitutional: She is " oriented to person, place, and time. She appears well-developed and well-nourished.   Neck: Trachea normal and normal range of motion. Neck supple. Carotid bruit is not present. No thyromegaly present.   Cardiovascular: Normal rate, regular rhythm, normal heart sounds and intact distal pulses. Exam reveals no gallop and no friction rub.   No murmur heard.  Pulmonary/Chest: Effort normal and breath sounds normal. She has no wheezes. She has no rales.   Abdominal: Soft. Bowel sounds are normal. There is no hepatosplenomegaly. There is no tenderness. No hernia.   Musculoskeletal: Normal range of motion. She exhibits no edema.   Lymphadenopathy:     She has no cervical adenopathy.   Neurological: She is alert and oriented to person, place, and time.   Skin: Skin is warm and dry.   Psychiatric: She has a normal mood and affect. Her behavior is normal. Judgment and thought content normal.     No visits with results within 30 Day(s) from this visit.   Latest known visit with results is:   Office Visit on 09/13/2019   Component Date Value Ref Range Status   • WBC 09/13/2019 7.0  3.4 - 10.8 x10E3/uL Final   • RBC 09/13/2019 4.79  3.77 - 5.28 x10E6/uL Final   • Hemoglobin 09/13/2019 13.9  11.1 - 15.9 g/dL Final   • Hematocrit 09/13/2019 41.3  34.0 - 46.6 % Final   • MCV 09/13/2019 86  79 - 97 fL Final   • MCH 09/13/2019 29.0  26.6 - 33.0 pg Final   • MCHC 09/13/2019 33.7  31.5 - 35.7 g/dL Final   • RDW 09/13/2019 13.8  12.3 - 15.4 % Final   • Platelets 09/13/2019 203  150 - 450 x10E3/uL Final   • Neutrophil Rel % 09/13/2019 66  Not Estab. % Final   • Lymphocyte Rel % 09/13/2019 26  Not Estab. % Final   • Monocyte Rel % 09/13/2019 7  Not Estab. % Final   • Eosinophil Rel % 09/13/2019 1  Not Estab. % Final   • Basophil Rel % 09/13/2019 0  Not Estab. % Final   • Neutrophils Absolute 09/13/2019 4.6  1.4 - 7.0 x10E3/uL Final   • Lymphocytes Absolute 09/13/2019 1.8  0.7 - 3.1 x10E3/uL Final   • Monocytes Absolute 09/13/2019 0.5   0.1 - 0.9 x10E3/uL Final   • Eosinophils Absolute 09/13/2019 0.1  0.0 - 0.4 x10E3/uL Final   • Basophils Absolute 09/13/2019 0.0  0.0 - 0.2 x10E3/uL Final   • Immature Granulocyte Rel % 09/13/2019 0  Not Estab. % Final   • Immature Grans Absolute 09/13/2019 0.0  0.0 - 0.1 x10E3/uL Final   • TSH 09/13/2019 1.880  0.450 - 4.500 uIU/mL Final   • Prolactin 09/13/2019 6.1  4.8 - 23.3 ng/mL Final   • HCG Qualitative 09/13/2019 Negative  Negative <6 mIU/mL Final   • Chlamydia trachomatis, AALIYAH 09/13/2019 Negative  Negative Final   • Neisseria gonorrhoeae, AALIYAH 09/13/2019 Negative  Negative Final       ASSESSMENT/ PLAN:    Diagnoses and all orders for this visit:    1. Mixed anxiety depressive disorder (Primary)  Assessment & Plan:  Long discussion about the condition, medication options. She wants to stay off of meds for now. She was encouraged to start counseling - given lost of local counselors.         Orders Placed Today:     No orders of the defined types were placed in this encounter.     The majority of this visit was spent in counseling. Time spent: 15 minutes    Management Plan:     An After Visit Summary was printed and given to the patient at discharge.    Follow-up: No follow-ups on file.    Daniela Duarte, APRN 2/20/2020 8:31 PM  This note was electronically signed.

## 2020-02-21 NOTE — ASSESSMENT & PLAN NOTE
Long discussion about the condition, medication options. She wants to stay off of meds for now. She was encouraged to start counseling - given lost of local counselors.

## 2020-02-28 ENCOUNTER — TELEPHONE (OUTPATIENT)
Dept: FAMILY MEDICINE CLINIC | Facility: CLINIC | Age: 35
End: 2020-02-28

## 2020-02-28 RX ORDER — FLUCONAZOLE 150 MG/1
150 TABLET ORAL ONCE
Qty: 1 TABLET | Refills: 1 | Status: SHIPPED | OUTPATIENT
Start: 2020-02-28 | End: 2020-02-28

## 2020-02-28 NOTE — TELEPHONE ENCOUNTER
Patient thinks she has another yeast infection. Wanting to know if you can send in another Rx for diflucan. Please advise.

## 2020-03-02 ENCOUNTER — OFFICE VISIT (OUTPATIENT)
Dept: FAMILY MEDICINE CLINIC | Facility: CLINIC | Age: 35
End: 2020-03-02

## 2020-03-02 VITALS
TEMPERATURE: 97.6 F | OXYGEN SATURATION: 96 % | SYSTOLIC BLOOD PRESSURE: 110 MMHG | BODY MASS INDEX: 25.39 KG/M2 | HEART RATE: 88 BPM | RESPIRATION RATE: 16 BRPM | WEIGHT: 158 LBS | DIASTOLIC BLOOD PRESSURE: 75 MMHG | HEIGHT: 66 IN

## 2020-03-02 DIAGNOSIS — R30.0 DYSURIA: Primary | ICD-10-CM

## 2020-03-02 DIAGNOSIS — N89.8 VAGINAL DISCHARGE: ICD-10-CM

## 2020-03-02 LAB
BILIRUB BLD-MCNC: NEGATIVE MG/DL
CLARITY, POC: ABNORMAL
COLOR UR: YELLOW
GLUCOSE UR STRIP-MCNC: NEGATIVE MG/DL
KETONES UR QL: NEGATIVE
LEUKOCYTE EST, POC: NEGATIVE
NITRITE UR-MCNC: NEGATIVE MG/ML
PH UR: 7 [PH] (ref 5–8)
PROT UR STRIP-MCNC: NEGATIVE MG/DL
RBC # UR STRIP: NEGATIVE /UL
SP GR UR: 1.02 (ref 1–1.03)
UROBILINOGEN UR QL: NORMAL

## 2020-03-02 PROCEDURE — 99213 OFFICE O/P EST LOW 20 MIN: CPT | Performed by: FAMILY MEDICINE

## 2020-03-02 RX ORDER — FLUCONAZOLE 200 MG/1
200 TABLET ORAL DAILY
Qty: 7 TABLET | Refills: 0 | Status: SHIPPED | OUTPATIENT
Start: 2020-03-02 | End: 2020-04-02

## 2020-03-02 RX ORDER — METRONIDAZOLE 500 MG/1
500 TABLET ORAL 2 TIMES DAILY
Qty: 14 TABLET | Refills: 0 | Status: SHIPPED | OUTPATIENT
Start: 2020-03-02 | End: 2020-04-02

## 2020-03-02 NOTE — PROGRESS NOTES
Patient presents with recurrent vaginal itching despite treatment with Diflucan. She sees Dr. Das (gynecology) routinely. She had been treated for UTI and continued with dysuria and itching. She has been treated with antifungal meds. She has no new sexual partner. She does have a history of genital herpes but her only flares were with pain and not itching.     Past Medical History:   Diagnosis Date   • Bipolar 2 disorder (CMS/HCC)    • Chronic constipation    • Generalized anxiety disorder    • Genital herpes    • Genital warts    • Gestational diabetes    • HPV in female    • Hyperbilirubinemia    • LGSIL on Pap smear of cervix    • Migraine headache      Past Surgical History:   Procedure Laterality Date   • COLPOSCOPY     • DILATATION AND CURETTAGE       Family History   Problem Relation Age of Onset   • Cancer Other    • Diabetes Other      Social History     Tobacco Use   • Smoking status: Former Smoker     Last attempt to quit: 2017     Years since quitting: 3.1   • Smokeless tobacco: Never Used   Substance Use Topics   • Alcohol use: No     Frequency: Never     Current Outpatient Medications on File Prior to Visit   Medication Sig Dispense Refill   • [DISCONTINUED] LINZESS 145 MCG capsule capsule TAKE ONE CAPSULE BY MOUTH EVERY DAY ON AN EMPTY STOMACH AT LEAST 30 MINUTES PRIOR TO THE FIRST MEAL OF THE DAY 30 capsule 0   • [DISCONTINUED] Vortioxetine HBr 20 MG tablet Take 20 mg by mouth Daily. 30 tablet 1     No current facility-administered medications on file prior to visit.         Review of Systems   Constitutional: Negative for chills, fatigue and fever.   Respiratory: Negative for cough and shortness of breath.    Cardiovascular: Negative for chest pain and palpitations.   Musculoskeletal: Negative for back pain and myalgias.   Neurological: Negative for dizziness and headache.   Psychiatric/Behavioral: Negative for depressed mood. The patient is not nervous/anxious.      Vitals:    03/02/20 1455   BP:  "110/75   BP Location: Left arm   Patient Position: Sitting   Cuff Size: Adult   Pulse: 88   Resp: 16   Temp: 97.6 °F (36.4 °C)   TempSrc: Oral   SpO2: 96%   Weight: 71.7 kg (158 lb)   Height: 167.6 cm (66\")     Body mass index is 25.5 kg/m².  Physical Exam   Constitutional: She is oriented to person, place, and time. She appears well-developed and well-nourished. No distress.   Cardiovascular: Normal rate, regular rhythm and normal heart sounds.   No murmur heard.  Pulmonary/Chest: Effort normal and breath sounds normal. She has no wheezes. She has no rales.   Abdominal: Soft. Bowel sounds are normal. She exhibits no distension.   Genitourinary:   Genitourinary Comments: dwclinws exam, preferring gynecology do that if necessary   Musculoskeletal: Normal range of motion.   Neurological: She is alert and oriented to person, place, and time.   Skin: Skin is warm and dry.   Psychiatric: She has a normal mood and affect. Her behavior is normal.           Diagnoses and all orders for this visit:    1. Dysuria (Primary)  -     POC Urinalysis Dipstick, Automated  -     Urine Culture - Urine, Urine, Clean Catch    2. Vaginal discharge  Assessment & Plan:  Uncertain etiology. I wonder if she has bacterial vaginosis. Will refill her diflucan for an extended period of time, also start metronidazole, and get her in with gynecology for further testing.  Discussed safe sexual practices.       Other orders  -     fluconazole (DIFLUCAN) 200 MG tablet; Take 1 tablet by mouth Daily.  Dispense: 7 tablet; Refill: 0  -     metroNIDAZOLE (FLAGYL) 500 MG tablet; Take 1 tablet by mouth 2 (Two) Times a Day.  Dispense: 14 tablet; Refill: 0    "

## 2020-03-04 ENCOUNTER — OFFICE VISIT (OUTPATIENT)
Dept: ORTHOPEDIC SURGERY | Facility: CLINIC | Age: 35
End: 2020-03-04

## 2020-03-04 VITALS
HEART RATE: 84 BPM | WEIGHT: 159 LBS | DIASTOLIC BLOOD PRESSURE: 70 MMHG | HEIGHT: 66 IN | BODY MASS INDEX: 25.55 KG/M2 | SYSTOLIC BLOOD PRESSURE: 100 MMHG

## 2020-03-04 DIAGNOSIS — S83.002D PATELLAR SUBLUXATION, LEFT, SUBSEQUENT ENCOUNTER: Primary | ICD-10-CM

## 2020-03-04 PROCEDURE — 99213 OFFICE O/P EST LOW 20 MIN: CPT | Performed by: FAMILY MEDICINE

## 2020-03-04 NOTE — PROGRESS NOTES
"Primary Care Sports Medicine Office Visit Note     Patient ID: Wanda Doe is a 34 y.o. female.    Chief Complaint:  Chief Complaint   Patient presents with   • Left Knee - Follow-up     HPI:    Ms. Wanda Doe is a 34 y.o. female who presents to the clinic today for MRI follow-up.  The patient states that since previous visit, she has not had any further falls or worsening, but she continues to have mild instability feeling about the patella and knee overall.  She returns today for MRI results, see below.    Past Medical History:   Diagnosis Date   • Bipolar 2 disorder (CMS/HCC)    • Chronic constipation    • Generalized anxiety disorder    • Genital herpes    • Genital warts    • Gestational diabetes    • HPV in female    • Hyperbilirubinemia    • LGSIL on Pap smear of cervix    • Migraine headache        Past Surgical History:   Procedure Laterality Date   • COLPOSCOPY     • DILATATION AND CURETTAGE         Family History   Problem Relation Age of Onset   • Cancer Other    • Diabetes Other      Social History     Occupational History   • Not on file   Tobacco Use   • Smoking status: Former Smoker     Last attempt to quit: 2017     Years since quitting: 3.1   • Smokeless tobacco: Never Used   Substance and Sexual Activity   • Alcohol use: No     Frequency: Never   • Drug use: No   • Sexual activity: Defer      Review of Systems   Constitutional: Negative for activity change and fever.   Musculoskeletal: Positive for arthralgias.   Skin: Negative for color change and rash.   Neurological: Negative for weakness.       Objective:    /70 (BP Location: Right arm, Patient Position: Sitting, Cuff Size: Adult)   Pulse 84   Ht 167.6 cm (66\")   Wt 72.1 kg (159 lb)   LMP 02/18/2020   BMI 25.66 kg/m²     Physical Examination:  Physical Exam   Constitutional: She appears well-developed and well-nourished. No distress.   HENT:   Head: Normocephalic and atraumatic.   Eyes: Conjunctivae are normal. " "  Cardiovascular: Intact distal pulses.   Pulmonary/Chest: Effort normal. No respiratory distress.   Neurological: She is alert.   Skin: Skin is warm. Capillary refill takes less than 2 seconds. She is not diaphoretic.   Nursing note and vitals reviewed.    Left Knee Exam     Tenderness   The patient is experiencing tenderness in the medial retinaculum.    Range of Motion   Extension: normal   Flexion: normal           Imaging and other tests:  MRI dated 2/20/2020 from open sided MRI, of the left knee impression is as follows:  1.  Small knee effusion.  No meniscal tear or cruciate/collateral ligament injury.    Assessment and Plan:    1. Patellar subluxation, left, subsequent encounter    I discussed the MRI results with this patient today, thankfully she is does not exhibit any MPFL tear or cartilage damage to the patellofemoral joint.  Recommended she continue physical therapy for strengthening of medial constraints/musculature of the quadriceps for patellar balance.  Due to continued pain, the patient requested corticosteroid injection, which we both elected to proceed with.  She tolerated this procedure well without any complaints or problems.  RTC 1-3 months or sooner if symptoms persist or worsen.    Barrington DAVIS \"Chance\" Josue ASH DO, CAQSM  03/06/20  2:29 PM    Disclaimer: Please note that areas of this note were completed with computer voice recognition software.  Quite often unanticipated grammatical, syntax, homophones, and other interpretive errors are inadvertently transcribed by the computer software. Please excuse any errors that have escaped final proofreading.  "

## 2020-03-04 NOTE — ASSESSMENT & PLAN NOTE
Uncertain etiology. I wonder if she has bacterial vaginosis. Will refill her diflucan for an extended period of time, also start metronidazole, and get her in with gynecology for further testing.  Discussed safe sexual practices.

## 2020-03-05 LAB
BACTERIA UR CULT: ABNORMAL
BACTERIA UR CULT: ABNORMAL
OTHER ANTIBIOTIC SUSC ISLT: ABNORMAL

## 2020-03-05 RX ORDER — SULFAMETHOXAZOLE AND TRIMETHOPRIM 800; 160 MG/1; MG/1
1 TABLET ORAL 2 TIMES DAILY
Qty: 20 TABLET | Refills: 0 | Status: SHIPPED | OUTPATIENT
Start: 2020-03-05 | End: 2020-04-02

## 2020-03-06 PROCEDURE — 20610 DRAIN/INJ JOINT/BURSA W/O US: CPT | Performed by: FAMILY MEDICINE

## 2020-03-06 RX ORDER — TRIAMCINOLONE ACETONIDE 40 MG/ML
80 INJECTION, SUSPENSION INTRA-ARTICULAR; INTRAMUSCULAR
Status: COMPLETED | OUTPATIENT
Start: 2020-03-06 | End: 2020-03-06

## 2020-03-06 RX ADMIN — TRIAMCINOLONE ACETONIDE 80 MG: 40 INJECTION, SUSPENSION INTRA-ARTICULAR; INTRAMUSCULAR at 14:31

## 2020-03-06 NOTE — PROGRESS NOTES
Procedure   Large Joint Arthrocentesis: L knee  Date/Time: 3/6/2020 2:31 PM  Consent given by: patient  Site marked: site marked  Timeout: Immediately prior to procedure a time out was called to verify the correct patient, procedure, equipment, support staff and site/side marked as required   Supporting Documentation  Indications: pain   Procedure Details  Location: knee - L knee  Preparation: Patient was prepped and draped in the usual sterile fashion  Needle size: 25 G  Approach: anteromedial  Medications administered: 80 mg triamcinolone acetonide 40 MG/ML (2cc of 1% lidocaine without epinepherine, and 2cc of 40mg Kenalog)  Patient tolerance: patient tolerated the procedure well with no immediate complications (Blood loss negligable, pt admits to immediate decrease in pain and improved ROM with gentle ambulation post injection.)

## 2020-04-02 RX ORDER — LINACLOTIDE 145 UG/1
CAPSULE, GELATIN COATED ORAL
Qty: 30 CAPSULE | Refills: 0 | Status: SHIPPED | OUTPATIENT
Start: 2020-04-02 | End: 2020-11-09

## 2020-04-15 ENCOUNTER — TELEMEDICINE (OUTPATIENT)
Dept: FAMILY MEDICINE CLINIC | Facility: CLINIC | Age: 35
End: 2020-04-15

## 2020-04-15 DIAGNOSIS — F41.8 MIXED ANXIETY DEPRESSIVE DISORDER: Primary | ICD-10-CM

## 2020-04-15 PROCEDURE — 99213 OFFICE O/P EST LOW 20 MIN: CPT | Performed by: NURSE PRACTITIONER

## 2020-04-15 RX ORDER — HYDROXYZINE HYDROCHLORIDE 25 MG/1
25 TABLET, FILM COATED ORAL 3 TIMES DAILY PRN
Qty: 90 TABLET | Refills: 0 | Status: SHIPPED | OUTPATIENT
Start: 2020-04-15 | End: 2020-07-09

## 2020-04-15 NOTE — PATIENT INSTRUCTIONS
Hydroxyzine capsules or tablets  What is this medicine?  HYDROXYZINE (denise DROX i zeen) is an antihistamine. It is used to treat anxiety and tension.   This medicine may be used for other purposes; ask your health care provider or pharmacist if you have questions.  COMMON BRAND NAME(S): ANX, Atarax, Rezine, Vistaril  What should I tell my health care provider before I take this medicine?  They need to know if you have any of these conditions:  -glaucoma  -heart disease  -history of irregular heartbeat  -kidney disease  -liver disease  -lung or breathing disease, like asthma  -stomach or intestine problems  -thyroid disease  -trouble passing urine  -an unusual or allergic reaction to hydroxyzine, cetirizine, other medicines, foods, dyes or preservatives  -pregnant or trying to get pregnant  -breast-feeding  How should I use this medicine?  Take this medicine by mouth with a full glass of water. Follow the directions on the prescription label. You may take this medicine with food or on an empty stomach. Take your medicine at regular intervals. Do not take your medicine more often than directed.  Overdosage: If you think you have taken too much of this medicine contact a poison control center or emergency room at once.  NOTE: This medicine is only for you. Do not share this medicine with others.  What may interact with this medicine?  Do not take this medicine with any of the following medications:  -cisapride  -dofetilide  -dronedarone  -pimozide  -thioridazine  This medicine may also interact with the following medications:  -alcohol  -antihistamines for allergy, cough, and cold  -atropine  -barbiturate medicines for sleep or seizures, like phenobarbital  -certain antibiotics like erythromycin or clarithromycin  -certain medicines for anxiety or sleep  -certain medicines for bladder problems like oxybutynin, tolterodine  -certain medicines for depression or psychotic disturbances  -certain medicines for irregular  heart beat  -certain medicines for Parkinson's disease like benztropine, trihexyphenidyl  -certain medicines for seizures like phenobarbital, primidone  -certain medicines for stomach problems like dicyclomine, hyoscyamine  -certain medicines for travel sickness like scopolamine  -ipratropium  -narcotic medicines for pain  -other medicines that prolong the QT interval (an abnormal heart rhythm)  This list may not describe all possible interactions. Give your health care provider a list of all the medicines, herbs, non-prescription drugs, or dietary supplements you use. Also tell them if you smoke, drink alcohol, or use illegal drugs. Some items may interact with your medicine.  What should I watch for while using this medicine?  Tell your doctor or health care professional if your symptoms do not improve.  You may get drowsy or dizzy. Do not drive, use machinery, or do anything that needs mental alertness until you know how this medicine affects you. Do not stand or sit up quickly, especially if you are an older patient. This reduces the risk of dizzy or fainting spells. Alcohol may interfere with the effect of this medicine. Avoid alcoholic drinks.  Your mouth may get dry. Chewing sugarless gum or sucking hard candy, and drinking plenty of water may help. Contact your doctor if the problem does not go away or is severe.  This medicine may cause dry eyes and blurred vision. If you wear contact lenses you may feel some discomfort. Lubricating drops may help. See your eye doctor if the problem does not go away or is severe.  Where should I keep my medicine?  Keep out of the reach of children.  Store at room temperature between 15 and 30 degrees C (59 and 86 degrees F). Keep container tightly closed. Throw away any unused medicine after the expiration date.  NOTE: This sheet is a summary. It may not cover all possible information. If you have questions about this medicine, talk to your doctor, pharmacist, or health  care provider.  © 2020 [employer] (2019-07-01 13:25:13)

## 2020-04-15 NOTE — PROGRESS NOTES
"Chief Complaint   Patient presents with   • Anxiety      This visit was conducted as a video visit that lasted for 15 minutes.   You have chosen to receive care through a telehealth visit.  Do you consent to use a video/audio connection for your medical care today? Yes    Subjective     Wanda Doe  has a past medical history of Bipolar 2 disorder (CMS/HCC), Chronic constipation, Generalized anxiety disorder, Genital herpes, Genital warts, Gestational diabetes, HPV in female, Hyperbilirubinemia, LGSIL on Pap smear of cervix, and Migraine headache.    HPI:  Anxiety & Depression  Patient has been having anxiety & depressive symptoms off & on for years.  She had been on Buspar in the past and it helped the first time she took it, but when she took it again last year she didn't feel like it was helping - or it might have made her anxiety worse. She had been on Lexapro in 2019 and experienced weight gain (35 pounds). Also had decreased libido. Didnt feel as depressed, but felt more irritable and overwhelmed while on Lexapro.     At her January appointment the Lexapro and Buspar were discontinued and Trintellix was started. She took the Trintellix for about a month. She stopped it because she felt \"too laid back.\" Didn't feel like cleaning her house or doing things she normally would do.     At her 2/19/20 appointment, after long discussion about the condition and medication options, she decided she wanted to stay off of medications. She was encouraged to start counseling and was given list of local counselors.     Today she reports that her anxiety has increased greatly over the last few weeks with the COVID-19 pandemic. She is not working, not earning income. She has had family members with recent health issues that she is worried about. Children are home from school and this is adding to her stressors. She would like to discuss medication again. She notes that she did well with Clonazepam PRN in the past.       No " Known Allergies      Current Outpatient Medications:   •  hydrOXYzine (ATARAX) 25 MG tablet, Take 1 tablet by mouth 3 (Three) Times a Day As Needed for Anxiety., Disp: 90 tablet, Rfl: 0  •  LINZESS 145 MCG capsule capsule, TAKE ONE CAPSULE BY MOUTH EVERY DAY ON AN EMPTY STOMACH AT LEAST 30 MINUTES PRIOR TO THE FIRST MEAL OF THE DAY, Disp: 30 capsule, Rfl: 0    Patient Active Problem List   Diagnosis   • Allergic rhinitis   • Elevated antinuclear antibody (FADI) level   • Mixed anxiety depressive disorder   • Genital herpes   • Tobacco use disorder   • Human papilloma virus (HPV) infection   • Hyperbilirubinemia   • Migraine headache   • Peripheral neuropathy   • Thyroid cyst   • Intramural leiomyoma of uterus   • Vaginal discharge        Past Surgical History:   Procedure Laterality Date   • COLPOSCOPY     • DILATATION AND CURETTAGE         Social History     Socioeconomic History   • Marital status:      Spouse name: Not on file   • Number of children: Not on file   • Years of education: Not on file   • Highest education level: Not on file   Tobacco Use   • Smoking status: Former Smoker     Packs/day: 0.00     Years: 5.00     Pack years: 0.00     Types: Cigarettes     Last attempt to quit: 2017     Years since quitting: 3.2   • Smokeless tobacco: Never Used   Substance and Sexual Activity   • Alcohol use: No     Frequency: Never   • Drug use: No   • Sexual activity: Yes     Partners: Male       Family History   Problem Relation Age of Onset   • Cancer Other    • Diabetes Other        Family history, surgical history, past medical history, Allergies and med's reviewed with patient today and updated in [employer] EMR.     ROS:  Review of Systems   Constitutional: Positive for fatigue. Negative for activity change, appetite change, diaphoresis, unexpected weight gain and unexpected weight loss.   Eyes: Negative for blurred vision.   Respiratory: Negative for cough, shortness of breath and wheezing.    Cardiovascular:  Negative for chest pain, palpitations and leg swelling.   Gastrointestinal: Negative for diarrhea, nausea and vomiting.   Endocrine: Negative for cold intolerance and heat intolerance.   Neurological: Negative for dizziness, syncope and headache.   Psychiatric/Behavioral: Positive for agitation, decreased concentration, sleep disturbance and stress. Negative for suicidal ideas and depressed mood. The patient is nervous/anxious.        OBJECTIVE:    Physical Exam   Constitutional: She appears well-developed and well-nourished.   Pulmonary/Chest: Effort normal.   Psychiatric: Her speech is normal and behavior is normal. Judgment and thought content normal. Her mood appears anxious.   Mildly anxious, tearful at times         ASSESSMENT/ PLAN:    Diagnoses and all orders for this visit:    1. Mixed anxiety depressive disorder (Primary)  Assessment & Plan:  Increase in her anxiety symptoms due to acute stressors.   Again encouraged her to start counseling via video visits.   Discussed medication options. She would like to see if [facility] Testing would be covered by her insurance.   In the meantime, she wants to try something that can just be used as needed.   Warned of drowsiness with use of Hydroxyzine.       Other orders  -     hydrOXYzine (ATARAX) 25 MG tablet; Take 1 tablet by mouth 3 (Three) Times a Day As Needed for Anxiety.  Dispense: 90 tablet; Refill: 0    The majority of this visit was spent in counseling. Time spent: 15 minutes    Orders Placed Today:     New Medications Ordered This Visit   Medications   • hydrOXYzine (ATARAX) 25 MG tablet     Sig: Take 1 tablet by mouth 3 (Three) Times a Day As Needed for Anxiety.     Dispense:  90 tablet     Refill:  0        Management Plan:     An After Visit Summary was available to patient through their [employer] Account.    Follow-up: No follow-ups on file.    ESTHELA Jewell 4/15/2020 10:33  This note was electronically signed.

## 2020-04-15 NOTE — ASSESSMENT & PLAN NOTE
Increase in her anxiety symptoms due to acute stressors.   Again encouraged her to start counseling via video visits.   Discussed medication options. She would like to see if [facility] Testing would be covered by her insurance.   In the meantime, she wants to try something that can just be used as needed.   Warned of drowsiness with use of Hydroxyzine.

## 2020-04-21 ENCOUNTER — TELEPHONE (OUTPATIENT)
Dept: FAMILY MEDICINE CLINIC | Facility: CLINIC | Age: 35
End: 2020-04-21

## 2020-04-21 NOTE — TELEPHONE ENCOUNTER
Patient is trying to get [facility] testing done. She got the test kit. It said on the envelope in large letters to register the kit online before she dose it. It asks for an email and login password. She can not figure out how to login. Please advise?

## 2020-04-30 ENCOUNTER — TELEPHONE (OUTPATIENT)
Dept: FAMILY MEDICINE CLINIC | Facility: CLINIC | Age: 35
End: 2020-04-30

## 2020-04-30 NOTE — TELEPHONE ENCOUNTER
Let patient know that I've received her [facility] Testing results. Have her set up an appointment with me next week (can be video) to go over results and discuss medications.

## 2020-05-06 ENCOUNTER — TELEMEDICINE (OUTPATIENT)
Dept: FAMILY MEDICINE CLINIC | Facility: CLINIC | Age: 35
End: 2020-05-06

## 2020-05-06 DIAGNOSIS — F41.8 MIXED ANXIETY DEPRESSIVE DISORDER: Primary | ICD-10-CM

## 2020-05-06 PROCEDURE — 99213 OFFICE O/P EST LOW 20 MIN: CPT | Performed by: NURSE PRACTITIONER

## 2020-05-06 RX ORDER — DESVENLAFAXINE SUCCINATE 50 MG/1
50 TABLET, EXTENDED RELEASE ORAL DAILY
Qty: 30 TABLET | Refills: 1 | Status: SHIPPED | OUTPATIENT
Start: 2020-05-06 | End: 2020-07-07 | Stop reason: SDUPTHER

## 2020-05-06 NOTE — PROGRESS NOTES
"Chief Complaint   Patient presents with   • Depression   • Anxiety      This was a video visit that lasted 20 minutes.    Subjective     Wanda Doe  has a past medical history of Bipolar 2 disorder (CMS/[facility]), Chronic constipation, Generalized anxiety disorder, Genital herpes, Genital warts, Gestational diabetes, HPV in female, Hyperbilirubinemia, LGSIL on Pap smear of cervix, and Migraine headache.    HPI:  Anxiety & Depression  Patient has been having anxiety & depressive symptoms off & on for years. She had been on Buspar in the past and it helped, but she did not feel that it was effective when tried again recently (5 mg TID). Had been on Lexapro since May 2019.  She experienced weight gain (35 pounds) on Lexapro. Also had decreased libido. Didnt feel as depressed, but felt more irritable and overwhelmed while on Lexapro.    At her January appointment the Lexapro and Buspar were discontinued and Trintellix was started. She took the Trintellix for about a month. She stopped it because she felt \"too laid back.\" Didn't feel like cleaning her house or doing things she normally would do.     She recently completed [facility] testing and presents today to discuss results. She also mentions today that she feels that there is a seasonal component to her symptoms - they seem to be worse every February/March.     No Known Allergies      Current Outpatient Medications:   •  desvenlafaxine (Pristiq) 50 MG 24 hr tablet, Take 1 tablet by mouth Daily., Disp: 30 tablet, Rfl: 1  •  hydrOXYzine (ATARAX) 25 MG tablet, Take 1 tablet by mouth 3 (Three) Times a Day As Needed for Anxiety., Disp: 90 tablet, Rfl: 0  •  LINZESS 145 MCG capsule capsule, TAKE ONE CAPSULE BY MOUTH EVERY DAY ON AN EMPTY STOMACH AT LEAST 30 MINUTES PRIOR TO THE FIRST MEAL OF THE DAY, Disp: 30 capsule, Rfl: 0    Patient Active Problem List   Diagnosis   • Allergic rhinitis   • Elevated antinuclear antibody (FADI) level   • Mixed anxiety depressive disorder "   • Genital herpes   • Tobacco use disorder   • Human papilloma virus (HPV) infection   • Hyperbilirubinemia   • Migraine headache   • Peripheral neuropathy   • Thyroid cyst   • Intramural leiomyoma of uterus   • Vaginal discharge        Past Surgical History:   Procedure Laterality Date   • COLPOSCOPY     • DILATATION AND CURETTAGE         Social History     Socioeconomic History   • Marital status:      Spouse name: Not on file   • Number of children: Not on file   • Years of education: Not on file   • Highest education level: Not on file   Tobacco Use   • Smoking status: Former Smoker     Packs/day: 0.00     Years: 5.00     Pack years: 0.00     Types: Cigarettes     Last attempt to quit: 2017     Years since quitting: 3.3   • Smokeless tobacco: Never Used   Substance and Sexual Activity   • Alcohol use: No     Frequency: Never   • Drug use: No   • Sexual activity: Yes     Partners: Male       Family History   Problem Relation Age of Onset   • Cancer Other    • Diabetes Other        Family history, surgical history, past medical history, Allergies and med's reviewed with patient today and updated in [employer] EMR.     ROS:  Review of Systems   Constitutional: Positive for fatigue. Negative for chills and fever.   Respiratory: Negative for shortness of breath.    Cardiovascular: Negative for chest pain and palpitations.   Genitourinary: Positive for decreased libido.   Musculoskeletal: Negative for back pain and myalgias.   Neurological: Negative for dizziness and headache.   Psychiatric/Behavioral: Positive for depressed mood and stress. Negative for self-injury and suicidal ideas. The patient is nervous/anxious.        OBJECTIVE:    Physical Exam   Constitutional: She appears well-developed and well-nourished. No distress.   Pulmonary/Chest: Effort normal.   Psychiatric: She has a normal mood and affect. Her behavior is normal. Judgment and thought content normal.   Tearful at times         ASSESSMENT/  PLAN:    Diagnoses and all orders for this visit:    1. Mixed anxiety depressive disorder (Primary)  Assessment & Plan:  Reviewed [employer] Testing results with patient.   Discussed risks/benefits of Pristiq.   Also discussed possibility of seasonal affective disorder - recommended use of light box.   Follow-up in 1 month, sooner for any problems.       Other orders  -     desvenlafaxine (Pristiq) 50 MG 24 hr tablet; Take 1 tablet by mouth Daily.  Dispense: 30 tablet; Refill: 1      Orders Placed Today:     New Medications Ordered This Visit   Medications   • desvenlafaxine (Pristiq) 50 MG 24 hr tablet     Sig: Take 1 tablet by mouth Daily.     Dispense:  30 tablet     Refill:  1        Management Plan:     An After Visit Summary was available to her in her [employer] Account.    Follow-up: Return in about 1 month (around 6/6/2020) for Follow-Up.    ESTHELA Jewell 5/6/2020 10:56  This note was electronically signed.

## 2020-05-06 NOTE — ASSESSMENT & PLAN NOTE
Reviewed [facility] results with patient.   Discussed risks/benefits of Pristiq.   Also discussed possibility of seasonal affective disorder - recommended use of light box.   Follow-up in 1 month, sooner for any problems.

## 2020-05-19 ENCOUNTER — TELEPHONE (OUTPATIENT)
Dept: FAMILY MEDICINE CLINIC | Facility: CLINIC | Age: 35
End: 2020-05-19

## 2020-05-26 RX ORDER — CITALOPRAM 20 MG/1
TABLET ORAL
Qty: 30 TABLET | Refills: 0 | OUTPATIENT
Start: 2020-05-26

## 2020-05-27 ENCOUNTER — TELEPHONE (OUTPATIENT)
Dept: FAMILY MEDICINE CLINIC | Facility: CLINIC | Age: 35
End: 2020-05-27

## 2020-05-27 NOTE — TELEPHONE ENCOUNTER
Patient called stating that she has not been feeling well the last several days. She has had diarrhea which is rare for her, some nasal congestion, body aches, and fatigue. States that she does not have a fever and only a slight cough. She spoke with her sister who is a nurse and she wanted her to check with you to see if you think that she should be tested for COVID.

## 2020-05-27 NOTE — TELEPHONE ENCOUNTER
Spoke to patient and [place] I told her that she would want to self quarantine until she received results.

## 2020-05-27 NOTE — TELEPHONE ENCOUNTER
It probably wouldn't hurt for her to be tested. She can go to [email or web address] to register for the free testing that the state is offering.

## 2020-07-06 RX ORDER — DESVENLAFAXINE SUCCINATE 50 MG/1
50 TABLET, EXTENDED RELEASE ORAL DAILY
Qty: 30 TABLET | Refills: 1 | OUTPATIENT
Start: 2020-07-06

## 2020-07-07 RX ORDER — DESVENLAFAXINE SUCCINATE 50 MG/1
50 TABLET, EXTENDED RELEASE ORAL DAILY
Qty: 30 TABLET | Refills: 1 | Status: SHIPPED | OUTPATIENT
Start: 2020-07-07 | End: 2020-07-09 | Stop reason: SDUPTHER

## 2020-07-09 ENCOUNTER — TELEMEDICINE (OUTPATIENT)
Dept: FAMILY MEDICINE CLINIC | Facility: CLINIC | Age: 35
End: 2020-07-09

## 2020-07-09 DIAGNOSIS — F41.8 MIXED ANXIETY DEPRESSIVE DISORDER: Primary | ICD-10-CM

## 2020-07-09 DIAGNOSIS — J30.9 ALLERGIC RHINITIS, UNSPECIFIED SEASONALITY, UNSPECIFIED TRIGGER: ICD-10-CM

## 2020-07-09 PROBLEM — N89.8 VAGINAL DISCHARGE: Status: RESOLVED | Noted: 2020-03-02 | Resolved: 2020-07-09

## 2020-07-09 PROCEDURE — 99213 OFFICE O/P EST LOW 20 MIN: CPT | Performed by: NURSE PRACTITIONER

## 2020-07-09 RX ORDER — DESVENLAFAXINE SUCCINATE 50 MG/1
50 TABLET, EXTENDED RELEASE ORAL DAILY
Qty: 90 TABLET | Refills: 0 | Status: SHIPPED | OUTPATIENT
Start: 2020-07-09 | End: 2020-10-15

## 2020-07-09 RX ORDER — CETIRIZINE HYDROCHLORIDE 10 MG/1
10 TABLET ORAL DAILY
Qty: 90 TABLET | Refills: 3 | Status: SHIPPED | OUTPATIENT
Start: 2020-07-09 | End: 2021-04-14

## 2020-07-09 NOTE — ASSESSMENT & PLAN NOTE
Acute flare.  Recommend starting daily nonsedating oral antihistamine.  Return if symptoms are not improving over the next few weeks.

## 2020-07-09 NOTE — PROGRESS NOTES
"Chief Complaint   Patient presents with   • Anxiety   • Depression      This visit was conducted as a video visit lasting 18 minutes.    Subjective     Wanda Doe  has a past medical history of Bipolar 2 disorder (CMS/HCC), Chronic constipation, Generalized anxiety disorder, Genital herpes, Genital warts, Gestational diabetes, HPV in female, Hyperbilirubinemia, LGSIL on Pap smear of cervix, and Migraine headache.    HPI:  Anxiety & Depression  Patient has been having anxiety & depressive symptoms off & on for years. She had been on Buspar in the past and it helped, but she did not feel that it was effective when tried again recently (5 mg TID). Had been on Lexapro since May 2019.  She experienced weight gain (35 pounds) and also had decreased libido. Didnt feel as depressed, but felt more irritable and overwhelmed while on Lexapro.     At her January appointment the Lexapro and Buspar were discontinued and Trintellix was started. She took the Trintellix for about a month. She stopped it because she felt \"too laid back.\" Didn't feel like cleaning her house or doing things she normally would do.      At her May appointment we reviewed the results of her [employer] testing and she was started on Pristiq. She also mentioned a seasonal component to her symptoms and light box use was recommended during winter months.     Today she reports that her depressive symptoms are better. She is feeling less irritable. She still has a decreased libido and has had anorgasmia. Still has some anxiety feelings. Still has some compulsion to clean her house. Sleeping okay, but has had fatigue. Thought it could be due to allergies. No suicidal or homicidal ideation. She feels like Pristiq has worked better for her than anything else she has ever tried.     Allergies  Thinks her allergies are flared. For the past several days she has been having clear rhinitis and itchy eyes, ears, & nose. Took Benadryl last night - helped a little. No " fever or cough. Feeling very fatigued.     No Known Allergies      Current Outpatient Medications:   •  desvenlafaxine (Pristiq) 50 MG 24 hr tablet, Take 1 tablet by mouth Daily., Disp: 90 tablet, Rfl: 0  •  LINZESS 145 MCG capsule capsule, TAKE ONE CAPSULE BY MOUTH EVERY DAY ON AN EMPTY STOMACH AT LEAST 30 MINUTES PRIOR TO THE FIRST MEAL OF THE DAY, Disp: 30 capsule, Rfl: 0  •  cetirizine (zyrTEC) 10 MG tablet, Take 1 tablet by mouth Daily., Disp: 90 tablet, Rfl: 3    Patient Active Problem List   Diagnosis   • Allergic rhinitis   • Elevated antinuclear antibody (FADI) level   • Mixed anxiety depressive disorder   • Genital herpes   • Tobacco use disorder   • Human papilloma virus (HPV) infection   • Hyperbilirubinemia   • Migraine headache   • Peripheral neuropathy   • Thyroid cyst   • Intramural leiomyoma of uterus        Past Surgical History:   Procedure Laterality Date   • COLPOSCOPY     • DILATATION AND CURETTAGE         Social History     Socioeconomic History   • Marital status:      Spouse name: Not on file   • Number of children: Not on file   • Years of education: Not on file   • Highest education level: Not on file   Tobacco Use   • Smoking status: Former Smoker     Packs/day: 0.00     Years: 5.00     Pack years: 0.00     Types: Cigarettes     Last attempt to quit: 2017     Years since quitting: 3.5   • Smokeless tobacco: Never Used   Substance and Sexual Activity   • Alcohol use: No     Frequency: Never   • Drug use: No   • Sexual activity: Yes     Partners: Male       Family History   Problem Relation Age of Onset   • Cancer Other    • Diabetes Other        Family history, surgical history, past medical history, Allergies and med's reviewed with patient today and updated in [facility] EMR.     ROS:  Review of Systems   Constitutional: Positive for fatigue. Negative for activity change, appetite change, chills, diaphoresis and fever.   HENT: Positive for congestion, postnasal drip and rhinorrhea.  Negative for ear discharge, ear pain, facial swelling, hearing loss, mouth sores, nosebleeds, sinus pressure, sneezing, sore throat, swollen glands, trouble swallowing and voice change.    Eyes: Positive for itching. Negative for discharge and redness.   Respiratory: Negative for cough, chest tightness, shortness of breath and wheezing.    Cardiovascular: Negative for chest pain and palpitations.   Gastrointestinal: Negative for abdominal pain, diarrhea, nausea and vomiting.   Genitourinary: Positive for decreased libido.   Musculoskeletal: Negative for back pain, myalgias, neck pain and neck stiffness.   Skin: Negative for rash.   Allergic/Immunologic: Negative for environmental allergies.   Neurological: Negative for dizziness and headache.   Hematological: Negative for adenopathy.   Psychiatric/Behavioral: Positive for stress. Negative for decreased concentration, self-injury, sleep disturbance, suicidal ideas and depressed mood. The patient is nervous/anxious.        OBJECTIVE:    Physical Exam   Constitutional: She appears well-developed and well-nourished. No distress.   Pulmonary/Chest: Effort normal.   Psychiatric: She has a normal mood and affect. Her behavior is normal. Judgment and thought content normal.       ASSESSMENT/ PLAN:    Diagnoses and all orders for this visit:    1. Mixed anxiety depressive disorder (Primary)  Assessment & Plan:  She has had improvement in some symptoms, but not in others.   I've recommended that she start counseling and see a psychiatrist for medication management.   In the meantime we will continue Pristiq.   I will follow-up with her in 6 weeks unless she is able to get in with a Psychiatrist sooner.   She was mailed a list of local counselors and Psychiatrists.       2. Allergic rhinitis, unspecified seasonality, unspecified trigger  Assessment & Plan:  Acute flare.  Recommend starting daily nonsedating oral antihistamine.  Return if symptoms are not improving over the  next few weeks.      Other orders  -     cetirizine (zyrTEC) 10 MG tablet; Take 1 tablet by mouth Daily.  Dispense: 90 tablet; Refill: 3  -     desvenlafaxine (Pristiq) 50 MG 24 hr tablet; Take 1 tablet by mouth Daily.  Dispense: 90 tablet; Refill: 0      Orders Placed Today:     New Medications Ordered This Visit   Medications   • cetirizine (zyrTEC) 10 MG tablet     Sig: Take 1 tablet by mouth Daily.     Dispense:  90 tablet     Refill:  3   • desvenlafaxine (Pristiq) 50 MG 24 hr tablet     Sig: Take 1 tablet by mouth Daily.     Dispense:  90 tablet     Refill:  0        Management Plan:     An After Visit Summary was printed and given to the patient at discharge.    Follow-up: No follow-ups on file.    Daniela Duarte, ESTHELA 7/9/2020 08:57  This note was electronically signed.

## 2020-07-09 NOTE — ASSESSMENT & PLAN NOTE
She has had improvement in some symptoms, but not in others.   I've recommended that she start counseling and see a psychiatrist for medication management.   In the meantime we will continue Pristiq.   I will follow-up with her in 6 weeks unless she is able to get in with a Psychiatrist sooner.   She was mailed a list of local counselors and Psychiatrists.

## 2020-10-15 RX ORDER — DESVENLAFAXINE SUCCINATE 50 MG/1
50 TABLET, EXTENDED RELEASE ORAL DAILY
Qty: 30 TABLET | Refills: 0 | Status: SHIPPED | OUTPATIENT
Start: 2020-10-15 | End: 2020-11-09

## 2020-10-30 ENCOUNTER — TELEPHONE (OUTPATIENT)
Dept: FAMILY MEDICINE CLINIC | Facility: CLINIC | Age: 35
End: 2020-10-30

## 2020-10-30 NOTE — TELEPHONE ENCOUNTER
Patient is requesting Covid Anti-body testing. Would you like for me to schedule her for one day next week with you.

## 2020-10-30 NOTE — TELEPHONE ENCOUNTER
She can get the test without an order at [facility] or [employer]. She can check their websites about pricing. If she wants an order she will need to schedule an appointment. This can be a video visit or in person.

## 2020-11-09 RX ORDER — DESVENLAFAXINE SUCCINATE 50 MG/1
50 TABLET, EXTENDED RELEASE ORAL DAILY
Qty: 30 TABLET | Refills: 0 | Status: SHIPPED | OUTPATIENT
Start: 2020-11-09 | End: 2021-04-14

## 2020-11-09 RX ORDER — LINACLOTIDE 145 UG/1
CAPSULE, GELATIN COATED ORAL
Qty: 90 CAPSULE | Refills: 0 | Status: SHIPPED | OUTPATIENT
Start: 2020-11-09

## 2020-11-19 ENCOUNTER — TELEPHONE (OUTPATIENT)
Dept: FAMILY MEDICINE CLINIC | Facility: CLINIC | Age: 35
End: 2020-11-19

## 2020-11-19 DIAGNOSIS — E01.0 THYROMEGALY: Primary | ICD-10-CM

## 2020-11-19 NOTE — TELEPHONE ENCOUNTER
----- Message from Nikki Mendez MA sent at 4/6/2020  2:12 PM EDT -----  Regarding: FW: Repeat Thyroid u/s      ----- Message -----  From: Nikki Mendez MA  Sent: 9/18/2019   8:13 AM EDT  To: Nikki Mendez MA  Subject: Repeat Thyroid u/s                               Repeat Thyroid u/s.

## 2020-12-04 ENCOUNTER — HOSPITAL ENCOUNTER (OUTPATIENT)
Dept: ULTRASOUND IMAGING | Facility: HOSPITAL | Age: 35
Discharge: HOME | End: 2020-12-04
Admitting: NURSE PRACTITIONER

## 2020-12-04 DIAGNOSIS — E01.0 THYROMEGALY: ICD-10-CM

## 2020-12-04 PROCEDURE — 76536 US EXAM OF HEAD AND NECK: CPT

## 2020-12-17 ENCOUNTER — OFFICE VISIT (OUTPATIENT)
Dept: FAMILY MEDICINE CLINIC | Facility: CLINIC | Age: 35
End: 2020-12-17

## 2020-12-17 VITALS
BODY MASS INDEX: 26.39 KG/M2 | SYSTOLIC BLOOD PRESSURE: 114 MMHG | OXYGEN SATURATION: 98 % | WEIGHT: 164.2 LBS | HEART RATE: 80 BPM | TEMPERATURE: 97.8 F | DIASTOLIC BLOOD PRESSURE: 77 MMHG | RESPIRATION RATE: 16 BRPM | HEIGHT: 66 IN

## 2020-12-17 DIAGNOSIS — H60.8X3 CHRONIC ECZEMATOUS OTITIS EXTERNA OF BOTH EARS: Primary | ICD-10-CM

## 2020-12-17 PROBLEM — G89.29 CHRONIC PELVIC PAIN IN FEMALE: Status: ACTIVE | Noted: 2020-12-17

## 2020-12-17 PROBLEM — R10.2 CHRONIC PELVIC PAIN IN FEMALE: Status: ACTIVE | Noted: 2020-12-17

## 2020-12-17 PROBLEM — R87.612 LOW GRADE SQUAMOUS INTRAEPITHELIAL LESION (LGSIL) ON CERVICOVAGINAL CYTOLOGIC SMEAR: Status: ACTIVE | Noted: 2020-12-17

## 2020-12-17 PROBLEM — N92.6 IRREGULAR MENSTRUAL CYCLE: Status: ACTIVE | Noted: 2020-12-17

## 2020-12-17 PROBLEM — R23.8 PAPULE: Status: ACTIVE | Noted: 2020-12-17

## 2020-12-17 PROCEDURE — 99213 OFFICE O/P EST LOW 20 MIN: CPT | Performed by: FAMILY MEDICINE

## 2020-12-17 RX ORDER — SULFAMETHOXAZOLE AND TRIMETHOPRIM 800; 160 MG/1; MG/1
1 TABLET ORAL 2 TIMES DAILY
Qty: 20 TABLET | Refills: 0 | Status: SHIPPED | OUTPATIENT
Start: 2020-12-17 | End: 2021-04-14

## 2020-12-17 NOTE — PROGRESS NOTES
Chief Complaint   Patient presents with   • Earache     Right. Echo. Drainage and swelling.        Patient complains of bilateral ear itching and left-sided drainage that has been going on for several months and worse the past few days.  She has a history of eczema in her ear canals.  She has not been able to have any improvement with OTC drops.  She has not had a fever or hearing loss.  She also complains of a pimple inside the left side of her nose.  She has not had any drainage or difficulty breathing.    Past Medical History:   Diagnosis Date   • Bipolar 2 disorder (CMS/HCC)    • Chronic constipation    • Generalized anxiety disorder    • Genital herpes    • Genital warts    • Gestational diabetes    • HPV in female    • Hyperbilirubinemia    • LGSIL on Pap smear of cervix    • Migraine headache    • Thyromegaly 12/4/2020     Past Surgical History:   Procedure Laterality Date   • COLPOSCOPY     • DILATATION AND CURETTAGE       Family History   Problem Relation Age of Onset   • Cancer Other    • Diabetes Other      Social History     Tobacco Use   • Smoking status: Former Smoker     Packs/day: 0.00     Years: 5.00     Pack years: 0.00     Types: Cigarettes     Quit date: 2017     Years since quitting: 3.9   • Smokeless tobacco: Never Used   Substance Use Topics   • Alcohol use: No     Frequency: Never     Current Outpatient Medications on File Prior to Visit   Medication Sig Dispense Refill   • desvenlafaxine (PRISTIQ) 50 MG 24 hr tablet TAKE 1 TABLET BY MOUTH DAILY 30 tablet 0   • Linzess 145 MCG capsule capsule TAKE 1 CAPSULE BY MOUTH EVERY DAY ON AN EMPTY STOMACH 30 MINUTES PRIOR TO FIRST MEAL OF THE DAY 90 capsule 0   • cetirizine (zyrTEC) 10 MG tablet Take 1 tablet by mouth Daily. 90 tablet 3     No current facility-administered medications on file prior to visit.         Review of Systems   Constitutional: Negative for fatigue.   Respiratory: Negative for cough and shortness of breath.    Cardiovascular:  "Negative for chest pain and palpitations.   Gastrointestinal: Negative for abdominal pain, diarrhea, nausea, vomiting, GERD and indigestion.   Neurological: Negative for headache.     Vitals:    12/17/20 1628   BP: 114/77   BP Location: Right arm   Patient Position: Sitting   Cuff Size: Adult   Pulse: 80   Resp: 16   Temp: 97.8 °F (36.6 °C)   TempSrc: Temporal   SpO2: 98%   Weight: 74.5 kg (164 lb 3.2 oz)   Height: 167.6 cm (66\")     Body mass index is 26.5 kg/m².  Physical Exam  Constitutional:       General: She is not in acute distress.     Appearance: She is well-developed.   HENT:      Head: Normocephalic and atraumatic.      Right Ear: Hearing, tympanic membrane, ear canal and external ear normal.      Left Ear: Hearing, tympanic membrane, ear canal and external ear normal.      Nose: Nose normal.      Mouth/Throat:      Pharynx: Uvula midline.      Tonsils: No tonsillar exudate. 0 on the right. 0 on the left.   Pulmonary:      Effort: Pulmonary effort is normal.      Breath sounds: Normal breath sounds.             Diagnoses and all orders for this visit:    1. Chronic eczematous otitis externa of both ears (Primary)  Assessment & Plan:  Exam is unremarkable.  We will try eardrops to see if we can improve her symptoms.      Other orders  -     acetic acid-aluminum acetate (DOMEBORO OTIC) 2 % otic solution; Administer 4 drops into both ears Every 3 (Three) Hours.  Dispense: 60 mL; Refill: 2  -     sulfamethoxazole-trimethoprim (Bactrim DS) 800-160 MG per tablet; Take 1 tablet by mouth 2 (Two) Times a Day.  Dispense: 20 tablet; Refill: 0    "

## 2020-12-17 NOTE — ASSESSMENT & PLAN NOTE
I did not appreciate any significant abnormality but suspect this is probably an inflammatory papule.  Will start antibiotics and follow-up for worsening.

## 2021-01-12 RX ORDER — DESVENLAFAXINE SUCCINATE 50 MG/1
50 TABLET, EXTENDED RELEASE ORAL DAILY
Qty: 30 TABLET | Refills: 0 | OUTPATIENT
Start: 2021-01-12

## 2021-01-19 ENCOUNTER — TELEPHONE (OUTPATIENT)
Dept: FAMILY MEDICINE CLINIC | Facility: CLINIC | Age: 36
End: 2021-01-19

## 2021-01-19 NOTE — TELEPHONE ENCOUNTER
She can drink a bottle of Magnesium Citrate and use a Fleets Enema. She should schedule an appointment for follow-up on constipation.

## 2021-01-19 NOTE — TELEPHONE ENCOUNTER
Patient called stating that she has not had a bowel movement in 6 days and that is with taking the linzess and her stool softener. She is getting to a point where she does not want to eat anything and wants to know what else you would suggest.

## 2021-01-19 NOTE — TELEPHONE ENCOUNTER
Patient notified. States she was able to go a little bit so she is not as nauseas and uncomfortable. She is going to try to take the Linzess again in the morning and see if it helps and if not she will  the bottle of mag citrate and the enema. She will call back later to schedule a follow up appointment with Daniela.

## 2021-04-14 ENCOUNTER — OFFICE VISIT (OUTPATIENT)
Dept: FAMILY MEDICINE CLINIC | Facility: CLINIC | Age: 36
End: 2021-04-14

## 2021-04-14 VITALS
BODY MASS INDEX: 25.27 KG/M2 | OXYGEN SATURATION: 98 % | TEMPERATURE: 98 F | DIASTOLIC BLOOD PRESSURE: 83 MMHG | HEIGHT: 67 IN | SYSTOLIC BLOOD PRESSURE: 117 MMHG | WEIGHT: 161 LBS | RESPIRATION RATE: 18 BRPM | HEART RATE: 99 BPM

## 2021-04-14 DIAGNOSIS — F41.8 MIXED ANXIETY DEPRESSIVE DISORDER: Primary | ICD-10-CM

## 2021-04-14 PROCEDURE — 99214 OFFICE O/P EST MOD 30 MIN: CPT | Performed by: NURSE PRACTITIONER

## 2021-04-14 RX ORDER — DESVENLAFAXINE 100 MG/1
100 TABLET, EXTENDED RELEASE ORAL DAILY
Qty: 30 TABLET | Refills: 1 | Status: SHIPPED | OUTPATIENT
Start: 2021-04-14 | End: 2021-08-03

## 2021-04-14 NOTE — PROGRESS NOTES
"Chief Complaint  Anxiety and Depression    Subjective          History of Present Illness  Patient presents for follow-up on anxiety and depression. These have both been problems for many years. She was switched to Pristiq last year and has done better on this medication than any other that she has tried. Recently her anxiety and depression have worsened. She has had a significant increase in stressors. She is having marital issues and her son is having behavioral issues at school. 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 No SI or HI. She has been encouraged to start counseling in the past, but has not done this. She feels like she is ready to start counseling now.         Current Outpatient Medications:   •  Linzess 145 MCG capsule capsule, TAKE 1 CAPSULE BY MOUTH EVERY DAY ON AN EMPTY STOMACH 30 MINUTES PRIOR TO FIRST MEAL OF THE DAY, Disp: 90 capsule, Rfl: 0  •  desvenlafaxine (Pristiq) 100 MG 24 hr tablet, Take 1 tablet by mouth Daily., Disp: 30 tablet, Rfl: 1     Review of Systems   Constitutional: Positive for fatigue.   Respiratory: Negative for cough and shortness of breath.    Cardiovascular: Negative for chest pain and palpitations.   Psychiatric/Behavioral: Negative for self-injury. The patient is nervous/anxious.         Objective   Vital Signs:   Vitals:    04/14/21 1616   BP: 117/83   Pulse: 99   Resp: 18   Temp: 98 °F (36.7 °C)   SpO2: 98%   Weight: 73 kg (161 lb)   Height: 170.2 cm (67\")     Body mass index is 25.22 kg/m².  No LMP recorded.     PHQ-2 Depression Screening  Little interest or pleasure in doing things?     Feeling down, depressed, or hopeless?     PHQ-2 Total Score      PHQ-9 Depression Screening  Little interest or pleasure in doing things?     Feeling down, depressed, or hopeless?     Trouble falling or staying asleep, or sleeping too much?     Feeling tired or having little energy?     Poor appetite or overeating?     Feeling bad " about yourself - or that you are a failure or have let yourself or your family down?     Trouble concentrating on things, such as reading the newspaper or watching television?     Moving or speaking so slowly that other people could have noticed? Or the opposite - being so fidgety or restless that you have been moving around a lot more than usual?     Thoughts that you would be better off dead, or of hurting yourself in some way?     PHQ-9 Total Score     If you checked off any problems, how difficult have these problems made it for you to do your work, take care of things at home, or get along with other people?       Physical Exam  Constitutional:       General: She is not in acute distress.     Appearance: Normal appearance. She is not ill-appearing.   Pulmonary:      Effort: Pulmonary effort is normal. No respiratory distress.   Neurological:      Mental Status: She is alert and oriented to person, place, and time.   Psychiatric:         Mood and Affect: Mood normal.         Behavior: Behavior normal.         Thought Content: Thought content normal.          Result Review :   The following data was reviewed by: ESTHELA Jewell on 04/14/2021:  Lab Results   Component Value Date    WBC 7.0 09/13/2019    HGB 13.9 09/13/2019    HCT 41.3 09/13/2019    MCV 86 09/13/2019     09/13/2019     Lab Results   Component Value Date    TSH 1.880 09/13/2019     Lab Results   Component Value Date    GLUCOSE 84 11/08/2018    BUN 10 11/08/2018    CREATININE 0.58 11/08/2018    EGFRIFNONA 141 11/08/2018    EGFRIFAFRI 122 11/08/2018    BCR NOT APPLICABLE (calc) 11/08/2018    K 4.1 11/08/2018    CALCIUM 9.9 11/08/2018    ALBUMIN 4.8 11/08/2018    LABIL2 1.9 (calc) 11/08/2018    AST 12 11/08/2018    ALT 7 11/08/2018          Assessment and Plan    Diagnoses and all orders for this visit:    1. Mixed anxiety depressive disorder (Primary)  Assessment & Plan:  Will increase Pristiq dose to 100 mg.   She was given another list of  local counselors. Strongly encouraged her to start counseling - both individual and family.   Follow-up in 1 month, sooner for any worsening or problems.       Other orders  -     desvenlafaxine (Pristiq) 100 MG 24 hr tablet; Take 1 tablet by mouth Daily.  Dispense: 30 tablet; Refill: 1          Follow Up   Return in about 1 month (around 5/14/2021) for Follow-Up depression.    Patient was given instructions and counseling regarding her condition and health maintenance advice. Please see specific information in the After Visit Summary.     Daniela Duarte, ESTHELA 4/14/2021 17:14 EDT  This note was electronically signed.

## 2021-04-14 NOTE — ASSESSMENT & PLAN NOTE
Will increase Pristiq dose to 100 mg.   She was given another list of local counselors. Strongly encouraged her to start counseling - both individual and family.   Follow-up in 1 month, sooner for any worsening or problems.

## 2021-07-06 ENCOUNTER — TELEPHONE (OUTPATIENT)
Dept: FAMILY MEDICINE CLINIC | Facility: CLINIC | Age: 36
End: 2021-07-06

## 2021-07-06 RX ORDER — FAMCICLOVIR 500 MG/1
1500 TABLET ORAL ONCE
Qty: 3 TABLET | Refills: 3 | Status: SHIPPED | OUTPATIENT
Start: 2021-07-06 | End: 2021-07-06

## 2021-07-21 ENCOUNTER — TELEPHONE (OUTPATIENT)
Dept: FAMILY MEDICINE CLINIC | Facility: CLINIC | Age: 36
End: 2021-07-21

## 2021-07-21 NOTE — TELEPHONE ENCOUNTER
PATIENT CALLED WANTING A COVID ANTIBODY TEST. CAN AN ORDER BE SENT TO [facility] IN [place] ON [address]  [phone]    SHE HAD COVID IN MAY    PLEASE CALL [phone]. SHE WOULD LIKE TO HAVE IT DONE TODAY. A MESSAGE CAN BE LEFT ON [employer]

## 2021-07-21 NOTE — TELEPHONE ENCOUNTER
If she wants to schedule a visit (in person or video) we can discuss testing.   Or she can get the test done without an order at [email or web address] or [facility].   Also, I believe that the [employer] is still testing people when they donate blood or plasma.

## 2021-08-03 ENCOUNTER — OFFICE (OUTPATIENT)
Dept: URBAN - METROPOLITAN AREA CLINIC 64 | Facility: CLINIC | Age: 36
End: 2021-08-03
Payer: COMMERCIAL

## 2021-08-03 VITALS
DIASTOLIC BLOOD PRESSURE: 89 MMHG | HEART RATE: 71 BPM | WEIGHT: 152 LBS | HEIGHT: 67 IN | SYSTOLIC BLOOD PRESSURE: 123 MMHG

## 2021-08-03 DIAGNOSIS — K59.00 CONSTIPATION, UNSPECIFIED: ICD-10-CM

## 2021-08-03 DIAGNOSIS — L29.0 PRURITUS ANI: ICD-10-CM

## 2021-08-03 PROCEDURE — 99203 OFFICE O/P NEW LOW 30 MIN: CPT | Performed by: INTERNAL MEDICINE

## 2021-08-03 RX ORDER — FLUCONAZOLE 100 MG/1
100 TABLET ORAL
Qty: 7 | Refills: 1 | Status: ACTIVE
Start: 2021-08-03

## 2021-08-03 RX ORDER — CLOTRIMAZOLE AND BETAMETHASONE DIPROPIONATE 10; .5 MG/ML; MG/ML
LOTION TOPICAL
Qty: 30 | Refills: 3 | Status: ACTIVE
Start: 2021-08-03

## 2021-08-03 RX ORDER — DESVENLAFAXINE 100 MG/1
100 TABLET, EXTENDED RELEASE ORAL DAILY
Qty: 30 TABLET | Refills: 1 | Status: SHIPPED | OUTPATIENT
Start: 2021-08-03 | End: 2021-09-30

## 2021-08-20 ENCOUNTER — TELEPHONE (OUTPATIENT)
Dept: FAMILY MEDICINE CLINIC | Facility: CLINIC | Age: 36
End: 2021-08-20

## 2021-08-20 NOTE — TELEPHONE ENCOUNTER
Patient called stating that she has a swollen, painful finger for about 3days.  I called patient back and informed her that we do not have a provider in office and suggested that she go to urgent care.

## 2021-09-01 ENCOUNTER — OFFICE VISIT (OUTPATIENT)
Dept: FAMILY MEDICINE CLINIC | Facility: CLINIC | Age: 36
End: 2021-09-01

## 2021-09-01 VITALS
OXYGEN SATURATION: 96 % | RESPIRATION RATE: 16 BRPM | BODY MASS INDEX: 24.11 KG/M2 | HEIGHT: 66 IN | DIASTOLIC BLOOD PRESSURE: 85 MMHG | WEIGHT: 150 LBS | HEART RATE: 91 BPM | SYSTOLIC BLOOD PRESSURE: 118 MMHG | TEMPERATURE: 98.1 F

## 2021-09-01 DIAGNOSIS — M25.441 FINGER JOINT SWELLING, RIGHT: Primary | ICD-10-CM

## 2021-09-01 PROCEDURE — 99213 OFFICE O/P EST LOW 20 MIN: CPT | Performed by: NURSE PRACTITIONER

## 2021-09-01 RX ORDER — CLOTRIMAZOLE AND BETAMETHASONE DIPROPIONATE 10; .5 MG/ML; MG/ML
LOTION TOPICAL
COMMUNITY
Start: 2021-08-04 | End: 2021-09-01

## 2021-09-01 NOTE — PROGRESS NOTES
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  The swelling is in the PIP joint to the MCP joint. Tried wearing a finger splint - finger didn't hurt as much, but was still swollen. Has been taking ibuprofen and this hasn't helped. Tried ice - this did not help. No known injury. No other joint pains at this time. Feels warm and tight. No redness, but sometimes looks a little blue to her.         Current Outpatient Medications:   •  desvenlafaxine (PRISTIQ) 100 MG 24 hr tablet, TAKE 1 TABLET BY MOUTH DAILY, Disp: 30 tablet, Rfl: 1  •  Linzess 145 MCG capsule capsule, TAKE 1 CAPSULE BY MOUTH EVERY DAY ON AN EMPTY STOMACH 30 MINUTES PRIOR TO FIRST MEAL OF THE DAY (Patient taking differently: Take 72 mcg by mouth Every Morning Before Breakfast.), Disp: 90 capsule, Rfl: 0     Review of Systems   Constitutional: Negative for chills and fever.   Respiratory: Negative for cough and shortness of breath.    Cardiovascular: Negative for chest pain and leg swelling.   Skin: Negative for rash.   Neurological: Negative for numbness.   Hematological: Negative for adenopathy.        Objective   Vital Signs:   Vitals:    09/01/21 1316   BP: 118/85   BP Location: Left arm   Patient Position: Sitting   Cuff Size: Adult   Pulse: 91   Resp: 16   Temp: 98.1 °F (36.7 °C)   TempSrc: Infrared   SpO2: 96%   Weight: 68 kg (150 lb)   Height: 167.6 cm (66\")     Body mass index is 24.21 kg/m².      Physical Exam  Constitutional:       Appearance: She is well-developed.   Neck:      Thyroid: No thyromegaly.      Vascular: No carotid bruit.      Trachea: Trachea normal.   Cardiovascular:      Rate and Rhythm: Normal rate and regular rhythm.      Pulses:           Radial pulses are 2+ on the right side.      Heart sounds: Normal heart sounds. No murmur heard.   No " friction rub. No gallop.       Comments: Brisk cap refill of all fingers.  Pulmonary:      Effort: Pulmonary effort is normal.      Breath sounds: Normal breath sounds. No wheezing or rales.   Musculoskeletal:         General: Normal range of motion.        Hands:       Cervical back: Normal range of motion and neck supple.      Comments: Edema and tenderness of the right 4th finger from the PIP joint to the MCP joint. No warmth or color changes noted.   Lymphadenopathy:      Cervical: No cervical adenopathy.   Skin:     General: Skin is warm and dry.   Neurological:      Mental Status: She is alert and oriented to person, place, and time.      Sensory: Sensation is intact.      Motor: Motor function is intact.   Psychiatric:         Behavior: Behavior normal.         Thought Content: Thought content normal.         Judgment: Judgment normal.          Assessment and Plan    Diagnoses and all orders for this visit:    1. Finger joint swelling, right (Primary)  Assessment & Plan:  Discussed possible causes with patient.   Will call with lab and x-ray results and further recommendations.   She may continue to take ibuprofen, use finger splint, and ice as needed in the meantime.     Orders:  -     CBC & Differential  -     Uric Acid  -     Rheumatoid Factor  -     Sedimentation Rate  -     C-reactive Protein  -     XR Finger 2+ View Right (In Office)          Follow Up   No follow-ups on file.    Patient was given instructions and counseling regarding her condition and health maintenance advice. Please see specific information in the After Visit Summary.     Daniela Duarte, ESTHELA 9/1/2021 16:59 EDT  This note was electronically signed.

## 2021-09-01 NOTE — ASSESSMENT & PLAN NOTE
Discussed possible causes with patient.   Will call with lab and x-ray results and further recommendations.   She may continue to take ibuprofen, use finger splint, and ice as needed in the meantime.

## 2021-09-02 ENCOUNTER — HOSPITAL ENCOUNTER (OUTPATIENT)
Dept: GENERAL RADIOLOGY | Facility: HOSPITAL | Age: 36
Discharge: HOME | End: 2021-09-02
Admitting: NURSE PRACTITIONER

## 2021-09-02 DIAGNOSIS — M25.441 FINGER JOINT SWELLING, RIGHT: Primary | ICD-10-CM

## 2021-09-02 LAB
BASOPHILS # BLD AUTO: 0 X10E3/UL (ref 0–0.2)
BASOPHILS NFR BLD AUTO: 0 %
CRP SERPL-MCNC: 5 MG/L (ref 0–10)
EOSINOPHIL # BLD AUTO: 0.1 X10E3/UL (ref 0–0.4)
EOSINOPHIL NFR BLD AUTO: 1 %
ERYTHROCYTE [DISTWIDTH] IN BLOOD BY AUTOMATED COUNT: 12.8 % (ref 11.7–15.4)
ERYTHROCYTE [SEDIMENTATION RATE] IN BLOOD BY WESTERGREN METHOD: 9 MM/HR (ref 0–32)
HCT VFR BLD AUTO: 42.3 % (ref 34–46.6)
HGB BLD-MCNC: 14.5 G/DL (ref 11.1–15.9)
IMM GRANULOCYTES # BLD AUTO: 0 X10E3/UL (ref 0–0.1)
IMM GRANULOCYTES NFR BLD AUTO: 0 %
LYMPHOCYTES # BLD AUTO: 1.5 X10E3/UL (ref 0.7–3.1)
LYMPHOCYTES NFR BLD AUTO: 22 %
MCH RBC QN AUTO: 31.1 PG (ref 26.6–33)
MCHC RBC AUTO-ENTMCNC: 34.3 G/DL (ref 31.5–35.7)
MCV RBC AUTO: 91 FL (ref 79–97)
MONOCYTES # BLD AUTO: 0.6 X10E3/UL (ref 0.1–0.9)
MONOCYTES NFR BLD AUTO: 9 %
NEUTROPHILS # BLD AUTO: 4.6 X10E3/UL (ref 1.4–7)
NEUTROPHILS NFR BLD AUTO: 68 %
PLATELET # BLD AUTO: 270 X10E3/UL (ref 150–450)
RBC # BLD AUTO: 4.66 X10E6/UL (ref 3.77–5.28)
RHEUMATOID FACT SERPL-ACNC: <10 IU/ML (ref 0–13.9)
URATE SERPL-MCNC: 4.5 MG/DL (ref 2.6–6.2)
WBC # BLD AUTO: 6.8 X10E3/UL (ref 3.4–10.8)

## 2021-09-02 PROCEDURE — 73140 X-RAY EXAM OF FINGER(S): CPT

## 2021-09-03 ENCOUNTER — TELEPHONE (OUTPATIENT)
Dept: FAMILY MEDICINE CLINIC | Facility: CLINIC | Age: 36
End: 2021-09-03

## 2021-09-03 NOTE — TELEPHONE ENCOUNTER
Caller: Wanda Doe    Relationship: Self    Best call back number: [phone]    Caller requesting test results: PATIENT    What test was performed: XRAY    When was the test performed: 9/2/21    Additional notes: PATIENT IS SEEKING RESULTS OF XRAY SCAN. PLEASE ADVISE

## 2021-09-03 NOTE — TELEPHONE ENCOUNTER
Patient notified of results, Called radiology and they are putting xray on disc and patient states she will pick it up by Tuesday. Referral sent to [facility] to contact patient.

## 2021-09-30 RX ORDER — DESVENLAFAXINE 100 MG/1
100 TABLET, EXTENDED RELEASE ORAL DAILY
Qty: 90 TABLET | Refills: 1 | Status: SHIPPED | OUTPATIENT
Start: 2021-09-30

## 2022-11-28 ENCOUNTER — OFFICE VISIT (OUTPATIENT)
Dept: PODIATRY | Facility: CLINIC | Age: 37
End: 2022-11-28

## 2022-11-28 VITALS — HEIGHT: 66 IN | WEIGHT: 150 LBS | HEART RATE: 84 BPM | OXYGEN SATURATION: 98 % | BODY MASS INDEX: 24.11 KG/M2

## 2022-11-28 DIAGNOSIS — G89.29 CHRONIC HEEL PAIN, LEFT: ICD-10-CM

## 2022-11-28 DIAGNOSIS — M79.672 CHRONIC HEEL PAIN, LEFT: ICD-10-CM

## 2022-11-28 DIAGNOSIS — M79.671 CHRONIC HEEL PAIN, RIGHT: ICD-10-CM

## 2022-11-28 DIAGNOSIS — M72.2 PLANTAR FASCIITIS: Primary | ICD-10-CM

## 2022-11-28 DIAGNOSIS — M24.573 EQUINUS CONTRACTURE OF ANKLE: ICD-10-CM

## 2022-11-28 DIAGNOSIS — G89.29 CHRONIC HEEL PAIN, RIGHT: ICD-10-CM

## 2022-11-28 PROCEDURE — 99213 OFFICE O/P EST LOW 20 MIN: CPT

## 2022-11-28 PROCEDURE — 20551 NJX 1 TENDON ORIGIN/INSJ: CPT

## 2022-11-28 RX ORDER — TRIAMCINOLONE ACETONIDE 40 MG/ML
20 INJECTION, SUSPENSION INTRA-ARTICULAR; INTRAMUSCULAR ONCE
Status: COMPLETED | OUTPATIENT
Start: 2022-11-28 | End: 2022-11-28

## 2022-11-28 RX ORDER — CLONAZEPAM 0.5 MG/1
0.5 TABLET ORAL DAILY
COMMUNITY
Start: 2022-11-03

## 2022-11-28 RX ADMIN — TRIAMCINOLONE ACETONIDE 20 MG: 40 INJECTION, SUSPENSION INTRA-ARTICULAR; INTRAMUSCULAR at 12:39

## 2022-11-28 NOTE — PROGRESS NOTES
"11/28/2022  Foot and Ankle Surgery - New Patient   Provider: ESTHELA Davila   Location: [facility]    Subjective:  Wanda Doe is a 37 y.o. female.     Chief Complaint   Patient presents with   • Left Foot - Pain   • Right Foot - Pain   • Initial Evaluation     TU WILSON ESTHELA Duarte, 09/01/2022       The patient is a 37-year-old female who presents to clinic for bilateral foot pain.     The patient has had bilateral foot pain for approximately 3 months. She reports significant pain in the back of her left ankle and it's extremely worse than her right. The pain radiates into her arches and inside the ankles bilaterally. The patient states her right foot pain as mild. She notes her right foot is \"walkable\" and her left foot is \"limpable.\" The patient states her boyfriend massages her feet and it helps relieve the stiffness, but does not take the pain away. She denies any injury. The patient was a previous runner, and notes she walks 2 to 3 miles daily, but has been unable to do it lately. She states in the morning, her feet are stiff, then around 6:00 p.m. if she is still standing, she will experience numbness and tingling. 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 She notes her whole foot will go numb and is unable to get a pedicure secondary to the pain. She has obtained Dr. Dunham's inserts for her shoes and wears [employer] shoes to work. The patient states she has been wearing a plantar fasciitis soft brace. She rates her pain in her left foot a 20 out of 10, she has cried because of the pain. The patient notes her physician prescribed a steroid dose pack previously which did not provide relief. She denies being diabetic. She inquires how long after the injection can she get a pedicure.     Additionally, she reports dislocating her knee previously. The patient had a steroid injection in her knee approximately 3 years ago.     No Known Allergies    Past Medical History:   Diagnosis " "Date   • Bipolar 2 disorder (HCC)    • Chronic constipation    • Generalized anxiety disorder    • Genital herpes    • Genital warts    • Gestational diabetes    • HPV in female    • Hyperbilirubinemia    • LGSIL on Pap smear of cervix    • Migraine headache    • Thyromegaly 2020       Past Surgical History:   Procedure Laterality Date   • COLPOSCOPY     • DILATATION AND CURETTAGE         Family History   Problem Relation Age of Onset   • Cancer Other    • Diabetes Other        Social History     Socioeconomic History   • Marital status:    Tobacco Use   • Smoking status: Former     Packs/day: 0.00     Years: 5.00     Pack years: 0.00     Types: Cigarettes     Quit date:      Years since quittin.9   • Smokeless tobacco: Never   Vaping Use   • Vaping Use: Never used   Substance and Sexual Activity   • Alcohol use: No   • Drug use: No   • Sexual activity: Yes     Partners: Male        Current Outpatient Medications on File Prior to Visit   Medication Sig Dispense Refill   • clonazePAM (KlonoPIN) 0.5 MG tablet Take 0.5 mg by mouth Daily.     • desvenlafaxine (PRISTIQ) 100 MG 24 hr tablet TAKE 1 TABLET BY MOUTH DAILY 90 tablet 1   • hydrocortisone 2.5 % ointment APPLY TOPICALLY TO EYE LIDS TWICE DAILY     • Linzess 145 MCG capsule capsule TAKE 1 CAPSULE BY MOUTH EVERY DAY ON AN EMPTY STOMACH 30 MINUTES PRIOR TO FIRST MEAL OF THE DAY (Patient taking differently: Take 72 mcg by mouth Every Morning Before Breakfast.) 90 capsule 0   • albuterol sulfate  (90 Base) MCG/ACT inhaler Inhale 2 puffs Every 4 (Four) Hours As Needed.     • fluticasone (FLONASE) 50 MCG/ACT nasal spray 2 sprays by Each Nare route Daily. Shake well before using.       No current facility-administered medications on file prior to visit.       Objective   Pulse 84   Ht 167.6 cm (66\")   Wt 68 kg (150 lb)   SpO2 98%   BMI 24.21 kg/m²     Foot/Ankle Exam:       General:   Appearance: appears stated age and healthy  "   Orientation: AAOx3    Affect: appropriate      VASCULAR      Right Foot Vascularity   Normal vascular exam    Dorsalis pedis:  2+  Posterior tibial:  2+  Skin Temperature: warm    Edema Grading:  None  CFT:  < 3 seconds  Pedal Hair Growth:  Present  Varicosities: none       Left Foot Vascularity   Normal vascular exam    Dorsalis pedis:  2+  Posterior tibial:  2+  Skin Temperature: warm    Edema Grading:  None  CFT:  < 3 seconds  Pedal Hair Growth:  Present  Varicosities: none       MUSCULOSKELETAL      Right Foot Musculoskeletal   Ecchymosis:  None  Tenderness: none    Arch:  Pes planus     Left Foot Musculoskeletal   Ecchymosis:  None  Tenderness: none    Arch:  Pes planus     MUSCLE STRENGTH     Right Foot Muscle Strength   Normal strength    Foot dorsiflexion:  5  Foot plantar flexion:  5  Foot inversion:  5  Foot eversion:  5     DERMATOLOGIC     Right Foot Dermatologic   Skin: skin intact    Nails: normal       Left Foot Dermatologic   Skin: skin intact    Nails: normal        Right Foot Additional Comments Pain with palpation to plantar heel      Left Foot Additional Comments: Pain with palpation to plantar heel      Assessment & Plan   Diagnoses and all orders for this visit:    1. Plantar fasciitis (Primary)    2. Chronic heel pain, left  -     XR Foot 3+ View Bilateral  -     Injection Tendon or Ligament  -     triamcinolone acetonide (KENALOG-40) injection 20 mg    3. Chronic heel pain, right  -     XR Foot 3+ View Bilateral  -     Injection Tendon or Ligament  -     triamcinolone acetonide (KENALOG-40) injection 20 mg    4. Equinus contracture of ankle      Xray images were obtained and reviewed today of bilateral feet.  Patient has small heel spur to bilateral feet.  Pathophysiology of Planter fasciitis and heel spur were discussed with patient.  We did discuss treatment options at length.  Patient would like to proceed with steroid injection to the left foot.  Additionally will recommend patient  proceed with Cam walking boot and over-the-counter arch supports with metatarsal pad.  Patient was given calf stretching exercises as well.    Plantar Fascia Steroid Injection: Left lower extremity    Consent and time out was performed before proceeding with the procedure.  The area of maximal tenderness was palpated near the plantar medial calcaneal tuberosity of left foot.  The area was cleansed with alcohol. Using anatomical landmarks the plantar fascia was visualized and a solution totaling 1.5 mL was injected about the origin of the plantar fascia.  The solution contained 0.5 mL of 1% lidocaine plain, 0.5 mL of 0.5% Marcaine plain, and 0.5 mL of Kenalog.  After the injection, the patient noted immediate pain relief.  Mild compression was placed at the injection site followed by a sterile bandage.  The patient tolerated the injection well without complication.    The patient is to follow up in 2 weeks.     Orders Placed This Encounter   Procedures   • Injection Tendon or Ligament     Order Specific Question:   Release to patient     Answer:   Routine Release   • XR Foot 3+ View Bilateral     Order Specific Question:   Reason for Exam:     Answer:   kev foot pain on calcaneus left is worse x 3 months, rm 9, wb     Order Specific Question:   Patient Pregnant     Answer:   No     Order Specific Question:   Does this patient have a diabetic monitoring/medication delivering device on?     Answer:   No     Order Specific Question:   Release to patient     Answer:   Routine Release          Transcribed from ambient dictation for ESTHELA Akins by Marie Leonard.  11/28/22   14:26 EST    Patient or patient representative verbalized consent to the visit recording.  I have personally performed the services described in this document as transcribed by the above individual, and it is both accurate and complete.  ESTHELA Akins  11/28/2022  16:28 EST

## 2023-01-09 ENCOUNTER — OFFICE VISIT (OUTPATIENT)
Dept: PODIATRY | Facility: CLINIC | Age: 38
End: 2023-01-09
Payer: MEDICAID

## 2023-01-09 VITALS — HEART RATE: 72 BPM | WEIGHT: 150 LBS | HEIGHT: 66 IN | OXYGEN SATURATION: 99 % | BODY MASS INDEX: 24.11 KG/M2

## 2023-01-09 DIAGNOSIS — M24.573 EQUINUS CONTRACTURE OF ANKLE: ICD-10-CM

## 2023-01-09 DIAGNOSIS — M72.2 PLANTAR FASCIITIS: Primary | ICD-10-CM

## 2023-01-09 PROCEDURE — 99213 OFFICE O/P EST LOW 20 MIN: CPT

## 2023-01-09 NOTE — PROGRESS NOTES
01/09/2023  Foot and Ankle Surgery - Established Patient/Follow-up  Provider: ESTHELA Davila   Location: [facility]    Subjective:  Wanda Doe is a 37 y.o. female.     Chief Complaint   Patient presents with   • Left Foot - Pain   • Right Foot - Pain   • Follow-up     TU Duarte ESTHELA 09/01/2021       The patient is here today for evaluation of left foot pain. She is accompanied by an adult female.    The patient was last seen on 11/28/2022. She reports she is having left foot pain. She states that she wore her boot until 12/23/2023 where she felt as the boot would give her nice support, however once she came out the boot she would have severe pain every day. She is not able to ambulatory like she use to ambulatory 2 to 3 miles a day. She reports she only been able to ambulatory twice since the last visit. She reports she does wear her insoles in her shoes along with doing stretches that she was advised to do. She reports that after about 40 minutes of doing the stretches and laying in bed, her pain will ease. She notes that the steroid injection did not give her much relief. The patient reports last night she was not able to sleep due to the throbbing sensation, like her foot was on fire and swollen. However, her foot was not swollen. She notes that her foot feels inflamed and hot today. She denies wearing bad shoes or doing extra activity. 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 She notes she notices that she has been walking on the side of her foot to help compensate walking on her foot completely.     No Known Allergies    Current Outpatient Medications on File Prior to Visit   Medication Sig Dispense Refill   • albuterol sulfate  (90 Base) MCG/ACT inhaler Inhale 2 puffs Every 4 (Four) Hours As Needed.     • clonazePAM (KlonoPIN) 0.5 MG tablet Take 0.5 mg by mouth Daily.     • desvenlafaxine (PRISTIQ) 100 MG 24 hr tablet TAKE 1 TABLET BY MOUTH DAILY 90 tablet 1   •  fluticasone (FLONASE) 50 MCG/ACT nasal spray 2 sprays by Each Nare route Daily. Shake well before using.     • hydrocortisone 2.5 % ointment APPLY TOPICALLY TO EYE LIDS TWICE DAILY     • Linzess 145 MCG capsule capsule TAKE 1 CAPSULE BY MOUTH EVERY DAY ON AN EMPTY STOMACH 30 MINUTES PRIOR TO FIRST MEAL OF THE DAY (Patient taking differently: Take 72 mcg by mouth Every Morning Before Breakfast.) 90 capsule 0     No current facility-administered medications on file prior to visit.       Objective   Pulse 72   Ht 167.6 cm (66\")   Wt 68 kg (150 lb)   SpO2 99%   BMI 24.21 kg/m²     Foot/Ankle Exam:       General:   Appearance: appears stated age and healthy    Orientation: AAOx3    Affect: appropriate      VASCULAR      Right Foot Vascularity   Normal vascular exam    Dorsalis pedis:  2+  Posterior tibial:  2+  Skin Temperature: warm    Edema Grading:  None  CFT:  < 3 seconds  Pedal Hair Growth:  Present  Varicosities: none       Left Foot Vascularity   Normal vascular exam    Dorsalis pedis:  2+  Posterior tibial:  2+  Skin Temperature: warm    Edema Grading:  None  CFT:  < 3 seconds  Pedal Hair Growth:  Present  Varicosities: none       MUSCULOSKELETAL      Right Foot Musculoskeletal   Ecchymosis:  None  Tenderness: none    Arch:  Pes planus     Left Foot Musculoskeletal   Ecchymosis:  None  Tenderness: none    Arch:  Pes planus     MUSCLE STRENGTH     Right Foot Muscle Strength   Normal strength    Foot dorsiflexion:  5  Foot plantar flexion:  5  Foot inversion:  5  Foot eversion:  5     DERMATOLOGIC     Right Foot Dermatologic   Skin: skin intact    Nails: normal       Left Foot Dermatologic   Skin: skin intact    Nails: normal        Right Foot Additional Comments Pain with palpation to plantar heel      Left Foot Additional Comments: Pain with palpation to plantar heel    01/09/2022: Pain and palpation to plantar heel. Inflammation noted.      Assessment & Plan   Diagnoses and all orders for this visit:    1.  Plantar fasciitis (Primary)  -     Ambulatory Referral to Physical Therapy    2. Equinus contracture of ankle  -     Ambulatory Referral to Physical Therapy      1. Plantar fasciitis  Informed the patient that with the plantar fasciitis, it is due to a lot of activity can cause flare up. For her to continue to wear a boot for 2 weeks to offset her body weight about 20%. Advised the patient to continue to wear the insoles in her shoes. Informed her to help with her issues to continue to do stretches. Informed the patient with the healing process, it can take about 1 year. Advised her that decreasing her activity or low activity will help. Also advised the patient to wear good support shoes, for her not to wear flip-flops, no flats, or ambulatory barefooted. I discussed other options, such as surgery and formal physical therapy, since the steroid injection did not help the patient with her pain. I recommended for the patient to see Dr. Sierra for surgical options. Informed her to take over-the-counter Ibuprofen for a few days to help with inflammation along with icing her left foot for 15 to 20 minutes a day. The patient is to follow up with Dr. Sierra in 6 weeks. I put in an order for physical therapy.    Orders Placed This Encounter   Procedures   • Ambulatory Referral to Physical Therapy     Referral Priority:   Routine     Referral Type:   Physical Therapy     Referral Reason:   Specialty Services Required     Referral Location:   [facility]     Requested Specialty:   Physical Therapy     Number of Visits Requested:   1        Transcribed from ambient dictation for ESTHELA Akins by Barbie Mathias  01/09/23   17:58 EST    Patient or patient representative verbalized consent to the visit recording.  I have personally performed the services described in this document as transcribed by the above individual, and it is both accurate and complete.

## 2023-02-02 ENCOUNTER — TREATMENT (OUTPATIENT)
Dept: PHYSICAL THERAPY | Facility: CLINIC | Age: 38
End: 2023-02-02
Payer: MEDICAID

## 2023-02-02 DIAGNOSIS — M72.2 BILATERAL PLANTAR FASCIITIS: ICD-10-CM

## 2023-02-02 DIAGNOSIS — M79.672 BILATERAL FOOT PAIN: Primary | ICD-10-CM

## 2023-02-02 DIAGNOSIS — M79.671 BILATERAL FOOT PAIN: Primary | ICD-10-CM

## 2023-02-02 PROCEDURE — 97535 SELF CARE MNGMENT TRAINING: CPT | Performed by: PHYSICAL THERAPIST

## 2023-02-02 PROCEDURE — 97140 MANUAL THERAPY 1/> REGIONS: CPT | Performed by: PHYSICAL THERAPIST

## 2023-02-02 PROCEDURE — 97162 PT EVAL MOD COMPLEX 30 MIN: CPT | Performed by: PHYSICAL THERAPIST

## 2023-02-02 NOTE — PROGRESS NOTES
"  Physical Therapy Initial Evaluation and Plan of Care                           [address] [place] [address] [address], [address], [facility] [place]  [address]    Patient: Wanda Doe   : 1985  Diagnosis/ICD-10 Code:  Bilateral foot pain [M79.671, M79.672]  Referring practitioner: Li Peck, *  Date of Initial Visit: 2023  Today's Date: 2023  Patient seen for 1 sessions           Subjective Questionnaire: FAAM 26 - 60-79% impairment  - Pt reports 40% PLOF      Subjective Evaluation    History of Present Illness  Mechanism of injury: Wanda reports her L foot hurts worse than the R. The R hurts but that may be d/t compensating from L foot. She notes she was walking \"wrong\" on her L foot so she's messed up some of her tendons (points to medial ankle). She notes she usually would walk ~3 miles per day, has 3 kids and is on her feet all the time. She has f/u w/physician next month. Onset: July/Aug 2022. She has been using boot while working to take some pressure off.     PMH: DM      Patient Occupation: hairdresser   Precautions and Work Restrictions: Has limited her hours since pain worsenedPain  Quality: burning, dull ache, radiating and sharp  Relieving factors: rest, support, relaxation and ice (massage)  Aggravating factors: movement, lifting, standing, ambulation and prolonged positioning  Progression: no change    Diagnostic Tests  X-ray: abnormal    Treatments  Current treatment: injection treatment and physical therapy  Current treatment comments: injection was ineffective.     Patient Goals  Patient goals for therapy: decreased pain, improved balance, increased strength, independence with ADLs/IADLs, return to sport/leisure activities and return to work             Objective          Palpation   Left   Tenderness of the lateral gastrocnemius, medial gastrocnemius and soleus.     Right   No palpable tenderness to the lateral gastrocnemius, medial gastrocnemius and soleus.     Active Range of Motion   Left " Ankle/Foot   Dorsiflexion (ke): 7 degrees     Right Ankle/Foot   Dorsiflexion (ke): 7 degrees     Ambulation   Weight-Bearing Status   Assistive device used: none    Observational Gait   Decreased walking speed and stride length.     Quality of Movement During Gait   Trunk    Trunk (Left): Positive left lateral lean over stance limb.   Trunk (Right): Positive lateral lean over stance limb.     Ankle    Ankle (Left): Positive supinated.   Ankle (Right): Positive supinated.       SCT: Pt was provided with a hard copy of the HEP and instructed in use of it and all listed exercises.  Pt was instructed to cease any exercise that was painful, or that feels as though the form is incorrect.  Pt will return with any questions or difficulty at next session.  Pt acknowledged these instructions and agreed. (seated & standing calf stretch, dbl leg heel raise, elastic band sidelying clamshell, arch lift)  SCT: Advised pt of risks/benefits of kinesiotaping. Pt was advised to roll tape off skin to remove it after 2-5 days. They were instructed to seek medical care if s/s of severe skin irritation occur. I explicitly warned pt against gluing tape back down to skin if it begins to roll up or come off. Pt verbally consented to taping.       Assessment & Plan     Assessment  Impairments: abnormal gait, abnormal muscle tone, abnormal or restricted ROM, activity intolerance, impaired balance, impaired physical strength, lacks appropriate home exercise program, pain with function and weight-bearing intolerance  Functional Limitations: walking, uncomfortable because of pain and standing  Assessment details: Wanda is a 38 yo female presenting to OP PT w/L>R foot/heel pain. She exhibits limited B ankle DF, tenderness at B heels, hip weakness. She exhibits abnormal gait, supinated & lacking pronation/toe off. She is a  and is limited in work tolerance d/t pain w/standing & walking. S/S are consistent w/plantar fasciitis, imaging  indicative of heel spurs on L.   The patient is an appropriate candidate for physical therapy and will benefit from skilled patient intervention in order address the above impairments/limitations.  The plan of care, goals and treatment plan were discussed with the patient and the patient is agreeable to participating in patient services.  Prognosis: good    Goals  Plan Goals: Short term:   1. Pt will demonstrate at least 10 deg B ankle DF to improve gait pattern to tolerate at least 30 min walking in grocery.  2. Pt will amb w/normal toe off and absence supination in standing for working at least 2 hours with </= 3/10 L heel pain.     LTG to be met in 12 wks  1. Pt will amb independently x at least 30  Min for short community negotiation to reach PLOF.  2. Pt will report at least 80% PLOF to reflect improved participation.   3. Pt will demonstrate standing at least 30 min with no greater than 3/10 L heel pain for work as .  4. Pt will demonstrate at least 4+/5 L hip abd strength to reduce abnormal jt stresses and normalize gait pattern to tolerate work duties as .   5. Pt will be safe, compliant, and indep with HEP for improved self management of symptoms or dysfunction.  6. Pt will improve FAAM from 60-79% impairment to no greater than 1-19% impairment.     Plan  Therapy options: will be seen for skilled therapy services  Planned modality interventions: electrical stimulation/Russian stimulation, TENS, cryotherapy, ultrasound, high voltage pulsed current (pain management), thermotherapy (hydrocollator packs) and dry needling  Planned therapy interventions: ADL retraining, balance/weight-bearing training, body mechanics training, flexibility, functional ROM exercises, gait training, home exercise program, IADL retraining, joint mobilization, manual therapy, neuromuscular re-education, postural training, soft tissue mobilization, strengthening, stretching, therapeutic activities, transfer  training and motor coordination training  Frequency: 2x week  Duration in weeks: 12  Treatment plan discussed with: patient        Timed:         Manual Therapy:    12     mins  45502; Including taping    Therapeutic Exercise:         mins  37434;     Neuromuscular Mayte:        mins  21877;    Therapeutic Activity:          mins  86204;     Gait Training:           mins  83211;     Ultrasound:          mins  15531;    Self-care  __15__ mins 34921  Tests & Measures              mins  47929      Un-Timed:  Electrical Stimulation:         mins  64175 ( );  Dry Needling          mins self-pay 20561  Traction          mins 09031  Low Eval          mins  77648  Mod Eval     20     mins  86037  High Eval                            mins  11230  Canal repositioning ___  mins  20976        Timed Treatment:   27   mins   Total Treatment:     47   mins    PT SIGNATURE: Tyra Sheldon PT, DPT, cert. DN  [place] license # [id number]  Electronically signed by Tyra Sheldon PT, 02/02/23, 8:31 AM EST    Initial Certification  Certification Period: 2/2/2023 through 5/2/2023  I certify that the therapy services are furnished while this patient is under my care.  The services outlined above are required by this patient, and will be reviewed every 90 days.     PHYSICIAN: Li Peck APRN    NPI: [id number]                                       DATE: ______________________________________________________________________________________________     Please sign and return via fax to [phone]. Thank you, [facility].

## 2023-02-14 ENCOUNTER — TREATMENT (OUTPATIENT)
Dept: PHYSICAL THERAPY | Facility: CLINIC | Age: 38
End: 2023-02-14
Payer: MEDICAID

## 2023-02-14 DIAGNOSIS — M79.671 BILATERAL FOOT PAIN: Primary | ICD-10-CM

## 2023-02-14 DIAGNOSIS — M72.2 BILATERAL PLANTAR FASCIITIS: ICD-10-CM

## 2023-02-14 DIAGNOSIS — M79.672 BILATERAL FOOT PAIN: Primary | ICD-10-CM

## 2023-02-14 PROCEDURE — 97110 THERAPEUTIC EXERCISES: CPT | Performed by: PHYSICAL THERAPIST

## 2023-02-14 PROCEDURE — 97140 MANUAL THERAPY 1/> REGIONS: CPT | Performed by: PHYSICAL THERAPIST

## 2023-02-14 NOTE — PROGRESS NOTES
Physical Therapy Daily Treatment Note      Patient: Wanda Doe   : 1985  Diagnosis/ICD-10 Code:  Bilateral foot pain [M79.671, M79.672]  Referring practitioner: Li Peck, *  Date of Initial Visit: Type: THERAPY  Noted: 2023  Today's Date: 2023  Patient seen for 2 sessions         Wanda Doe reports: the first visit went really well to her suptisese stating cupping felt really good as well she felt better throughout the past week with the taping. Pt. states there is still pain but it has been much more bearable.    Objective   See Exercise, Manual, and Modality Logs for complete treatment.     K-tape Gastroc/achilles support strip  K-tape arch support strip     Assessment/Plan   Py responds very well to cupping again this date and added progression for STM for tight musculature. Pt. Does exercises well and is very invested in HEP. Pt. is motivated to get relief.    Goals  Plan Goals: Short term:   1. Pt will demonstrate at least 10 deg B ankle DF to improve gait pattern to tolerate at least 30 min walking in grocery.  2. Pt will amb w/normal toe off and absence supination in standing for working at least 2 hours with </= 3/10 L heel pain.     LTG to be met in 12 wks  1. Pt will amb independently x at least 30  Min for short community negotiation to reach PLOF.  2. Pt will report at least 80% PLOF to reflect improved participation.   3. Pt will demonstrate standing at least 30 min with no greater than 3/10 L heel pain for work as .  4. Pt will demonstrate at least 4+/5 L hip abd strength to reduce abnormal jt stresses and normalize gait pattern to tolerate work duties as .   5. Pt will be safe, compliant, and indep with HEP for improved self management of symptoms or dysfunction.  6. Pt will improve FAAM from 60-79% impairment to no greater than 1-19% impairment.     Progress strengthening /stabilization /functional activity           Timed:          Manual Therapy:    25     mins  73162;     Therapeutic Exercise:    15     mins  82016;         Timed Treatment:   40   mins   Total Treatment:     40   mins    Pauly Triplett PTA  Physical Therapist Assistant License #[id number]

## 2023-02-21 ENCOUNTER — TREATMENT (OUTPATIENT)
Dept: PHYSICAL THERAPY | Facility: CLINIC | Age: 38
End: 2023-02-21
Payer: MEDICAID

## 2023-02-21 DIAGNOSIS — M79.672 BILATERAL FOOT PAIN: Primary | ICD-10-CM

## 2023-02-21 DIAGNOSIS — M79.671 BILATERAL FOOT PAIN: Primary | ICD-10-CM

## 2023-02-21 DIAGNOSIS — M72.2 BILATERAL PLANTAR FASCIITIS: ICD-10-CM

## 2023-02-21 PROCEDURE — 97110 THERAPEUTIC EXERCISES: CPT | Performed by: PHYSICAL THERAPIST

## 2023-02-21 PROCEDURE — 97140 MANUAL THERAPY 1/> REGIONS: CPT | Performed by: PHYSICAL THERAPIST

## 2023-02-21 NOTE — PROGRESS NOTES
Physical Therapy Daily Treatment Note      Patient: Wanda Doe   : 1985  Diagnosis/ICD-10 Code:  Bilateral foot pain [M79.671, M79.672]  Referring practitioner: Li Peck, *  Date of Initial Visit: Type: THERAPY  Noted: 2023  Today's Date: 2023  Patient seen for 3 sessions         Wanda Doe reports: she continues to do a bit better but wanted to point out a new discovery when she attempted to move her toes she noticed she had no control of the 5th digit on her L foot. Pt. States and also notices that toe crowds the 4th digit as well.    Objective   See Exercise, Manual, and Modality Logs for complete treatment.     Assessment/Plan   Pt. tolerates treatment well this date and shows good response to current techniques. 5th digit on the L LE ws stretched and mobilized for Pt. And after stretching improved space was noted but mobility was still unachievable. Pt. Will continue to benefit form manual techniques for improved mobility and circulation to lead to pain releif.    Goals  Plan Goals: Short term:   1. Pt will demonstrate at least 10 deg B ankle DF to improve gait pattern to tolerate at least 30 min walking in grocery.  2. Pt will amb w/normal toe off and absence supination in standing for working at least 2 hours with </= 3/10 L heel pain.     LTG to be met in 12 wks  1. Pt will amb independently x at least 30  Min for short community negotiation to reach PLOF.  2. Pt will report at least 80% PLOF to reflect improved participation.   3. Pt will demonstrate standing at least 30 min with no greater than 3/10 L heel pain for work as .  4. Pt will demonstrate at least 4+/5 L hip abd strength to reduce abnormal jt stresses and normalize gait pattern to tolerate work duties as .   5. Pt will be safe, compliant, and indep with HEP for improved self management of symptoms or dysfunction.  6. Pt will improve FAAM from 60-79% impairment to no greater than  1-19% impairment.     Progress strengthening /stabilization /functional activity           Timed:         Manual Therapy:    25     mins  07858;     Therapeutic Exercise:    15     mins  24004;       Timed Treatment:   40   mins   Total Treatment:     40   mins    Pauly Triplett PTA  Physical Therapist Assistant License #[id number]

## 2023-02-23 ENCOUNTER — OFFICE VISIT (OUTPATIENT)
Dept: PODIATRY | Facility: CLINIC | Age: 38
End: 2023-02-23
Payer: MEDICAID

## 2023-02-23 VITALS — WEIGHT: 150 LBS | TEMPERATURE: 96 F | BODY MASS INDEX: 24.11 KG/M2 | HEIGHT: 66 IN | OXYGEN SATURATION: 94 %

## 2023-02-23 DIAGNOSIS — M72.2 PLANTAR FASCIITIS, LEFT: ICD-10-CM

## 2023-02-23 DIAGNOSIS — M79.672 LEFT FOOT PAIN: Primary | ICD-10-CM

## 2023-02-23 DIAGNOSIS — M24.573 EQUINUS CONTRACTURE OF ANKLE: ICD-10-CM

## 2023-02-23 PROCEDURE — 99213 OFFICE O/P EST LOW 20 MIN: CPT | Performed by: PODIATRIST

## 2023-02-23 PROCEDURE — 20550 NJX 1 TENDON SHEATH/LIGAMENT: CPT | Performed by: PODIATRIST

## 2023-02-23 RX ORDER — OXYBUTYNIN CHLORIDE 10 MG/1
1 TABLET, EXTENDED RELEASE ORAL DAILY
COMMUNITY
Start: 2023-01-09

## 2023-02-23 RX ORDER — TRIAMCINOLONE ACETONIDE 40 MG/ML
20 INJECTION, SUSPENSION INTRA-ARTICULAR; INTRAMUSCULAR ONCE
Status: COMPLETED | OUTPATIENT
Start: 2023-02-23 | End: 2023-02-23

## 2023-02-23 RX ADMIN — TRIAMCINOLONE ACETONIDE 20 MG: 40 INJECTION, SUSPENSION INTRA-ARTICULAR; INTRAMUSCULAR at 15:57

## 2023-02-23 NOTE — PROGRESS NOTES
02/23/2023  Foot and Ankle Surgery - Established Patient/Follow-up  Provider: Dr. Be Sierra DPM  Location: [facility]    Subjective:  Wanda Doe is a 37 y.o. female.     Chief Complaint   Patient presents with   • Left Foot - Follow-up, Edema, Pain     Per pt it feels better with PT but at times she feels no improvements   Per pt she states that her pinky toe on her LT foot has no movement / numbness is present    • Follow-up     JUMANA Duarte date unknown        HPI: The patient was last seen by ESTHELA Akins approximately 6 weeks ago. She reports bilateral foot pain, left greater than right, that has been present since 07/2022. She denies any known injury; however, she notes that she began walking regularly daily. The patient states that she picked up a routine of walking 2 times per week. She notes mild discomfort with terrain elevation. She reports receiving 1 steroid injection in the past, which did not provide any relief. The patient confirms she is wearing her shoes and inserts daily. She notes soreness in her feet at the end of the day. She has been going to the gym and using a machine, which provides relief.    The patient reports a history of knee injury.      No Known Allergies    Current Outpatient Medications on File Prior to Visit   Medication Sig Dispense Refill   • albuterol sulfate  (90 Base) MCG/ACT inhaler Inhale 2 puffs Every 4 (Four) Hours As Needed.     • clonazePAM (KlonoPIN) 0.5 MG tablet Take 0.5 mg by mouth Daily.     • desvenlafaxine (PRISTIQ) 100 MG 24 hr tablet TAKE 1 TABLET BY MOUTH DAILY 90 tablet 1   • fluticasone (FLONASE) 50 MCG/ACT nasal spray 2 sprays by Each Nare route Daily. Shake well before using.     • hydrocortisone 2.5 % ointment APPLY TOPICALLY TO EYE LIDS TWICE DAILY     • Linzess 145 MCG capsule capsule TAKE 1 CAPSULE BY MOUTH EVERY DAY ON AN EMPTY STOMACH 30 MINUTES PRIOR TO FIRST MEAL OF THE DAY (Patient taking differently: Take 72  "mcg by mouth Every Morning Before Breakfast.) 90 capsule 0   • oxybutynin XL (DITROPAN-XL) 10 MG 24 hr tablet Take 1 tablet by mouth Daily.       No current facility-administered medications on file prior to visit.       Objective   Temp 96 °F (35.6 °C)   Ht 167.6 cm (66\")   Wt 68 kg (150 lb)   SpO2 94%   BMI 24.21 kg/m²     Foot/Ankle Exam:       General:   Appearance: appears stated age and healthy    Orientation: AAOx3    Affect: appropriate      VASCULAR      Right Foot Vascularity   Normal vascular exam    Dorsalis pedis:  2+  Posterior tibial:  2+  Skin Temperature: warm    Edema Grading:  None  CFT:  < 3 seconds  Pedal Hair Growth:  Present  Varicosities: none       Left Foot Vascularity   Normal vascular exam    Dorsalis pedis:  2+  Posterior tibial:  2+  Skin Temperature: warm    Edema Grading:  None  CFT:  < 3 seconds  Pedal Hair Growth:  Present  Varicosities: none       MUSCULOSKELETAL      Right Foot Musculoskeletal   Ecchymosis:  None  Tenderness: none    Arch:  Pes planus     Left Foot Musculoskeletal   Ecchymosis:  None  Tenderness: none    Arch:  Pes planus     MUSCLE STRENGTH     Right Foot Muscle Strength   Normal strength    Foot dorsiflexion:  5  Foot plantar flexion:  5  Foot inversion:  5  Foot eversion:  5     DERMATOLOGIC     Right Foot Dermatologic   Skin: skin intact    Nails: normal       Left Foot Dermatologic   Skin: skin intact    Nails: normal        Right Foot Additional Comments Pain with palpation to plantar heel      Left Foot Additional Comments: Pain with palpation to plantar heel    01/09/2022: Pain and palpation to plantar heel. Inflammation noted.    02/23/2023: Continued discomfort with palpation involving the plantar medial calcaneal tuberosity of the left heel. No gross deformity, instability. Mild equinus contracture with knee extended and flexed.      Assessment & Plan   Diagnoses and all orders for this visit:    1. Left foot pain (Primary)    2. Plantar fasciitis, " left    3. Equinus contracture of ankle  -     Injection Tendon or Ligament  -     triamcinolone acetonide (KENALOG-40) injection 20 mg        Patient presents to the office today for a follow-up regarding her bilateral feet, left greater than right. We discussed the etiology and biomechanics involved with her bilateral foot pain. I recommend repeat steroid injections. We discussed PRP injections. We discussed surgical intervention; however, I do not believe this is necessary at this time. We discussed avoidance of ambulating while barefoot, as well as avoidance of wearing flip flops or squats. The patient will return to the office in 6 weeks for reevaluation.    Plantar Fascia Steroid Injection: Left foot    Consent and time out was performed before proceeding with the procedure. The area of maximal tenderness was palpated near the plantar medial calcaneal tuberosity of left foot. The area was cleansed with alcohol. Under ultrasound guidance, the plantar fascia was visualized and a solution totaling 1.5 mL was injected about the origin of the plantar fascia. The solution contained 0.5 mL of 1% lidocaine plain, 0.5 mL of 0.5% Marcaine plain, and 0.5 mL of Kenalog. After the injection, the patient noted immediate pain relief. Mild compression was placed at the injection site followed by a sterile bandage. The patient tolerated the injection well without complication.    Greater than 20 minutes was spent before, during, and after evaluation for patient care.    Orders Placed This Encounter   Procedures   • Injection Tendon or Ligament     Order Specific Question:   Release to patient     Answer:   Routine Release             Note is dictated utilizing voice recognition software. Unfortunately this leads to occasional typographical errors. I apologize in advance if the situation occurs. If questions occur please do not hesitate to call our office.    Transcribed from ambient dictation for DURAN Sierra DPM by  Carolyn Joseph.  02/23/23   14:48 EST    Patient or patient representative verbalized consent to the visit recording.  I have personally performed the services described in this document as transcribed by the above individual, and it is both accurate and complete.

## 2023-02-24 ENCOUNTER — TREATMENT (OUTPATIENT)
Dept: PHYSICAL THERAPY | Facility: CLINIC | Age: 38
End: 2023-02-24
Payer: MEDICAID

## 2023-02-24 ENCOUNTER — TELEPHONE (OUTPATIENT)
Dept: PHYSICAL THERAPY | Facility: CLINIC | Age: 38
End: 2023-02-24

## 2023-02-24 DIAGNOSIS — M79.672 BILATERAL FOOT PAIN: Primary | ICD-10-CM

## 2023-02-24 DIAGNOSIS — M72.2 BILATERAL PLANTAR FASCIITIS: ICD-10-CM

## 2023-02-24 DIAGNOSIS — M79.671 BILATERAL FOOT PAIN: Primary | ICD-10-CM

## 2023-02-24 PROCEDURE — 97035 APP MDLTY 1+ULTRASOUND EA 15: CPT | Performed by: PHYSICAL THERAPIST

## 2023-02-24 PROCEDURE — 97140 MANUAL THERAPY 1/> REGIONS: CPT | Performed by: PHYSICAL THERAPIST

## 2023-02-24 NOTE — TELEPHONE ENCOUNTER
PATIENT CALLED TO CONFIRM IF SHE COULD OR COULD NOT COME TO HER APT TODAY BEING THAT SHE HAD A CORTIZONE SHOT YESTERDAY-I COULD NOT GET THROUGH TO -PATIENT WILL BE COMING ON TO HER APT    No

## 2023-02-24 NOTE — PROGRESS NOTES
Physical Therapy Daily Treatment Note  [address] [place] [address] [address], [address], [place]  [address]    Patient: Wanda Doe   : 1985  Diagnosis/ICD-10 Code:  Bilateral foot pain [M79.671, M79.672]   Problems Addressed this Visit    None  Visit Diagnoses     Bilateral foot pain    -  Primary    Bilateral plantar fasciitis          Diagnoses       Codes Comments    Bilateral foot pain    -  Primary ICD-10-CM: M79.671, M79.672  ICD-9-CM: 729.5     Bilateral plantar fasciitis     ICD-10-CM: M72.2  ICD-9-CM: 728.71         Referring practitioner: Li Peck, *  Date of Initial Visit: Type: THERAPY  Noted: 2023  Today's Date: 2023    VISIT#: 4    Subjective   Wanda reports she had steroid injection yesterday. It felt much deeper than first injection, done by a different provider. .     Objective     See Exercise, Manual, and Modality Logs for complete treatment.   Tenderness to B heels, DF 9 deg B today    Assessment/Plan  Intro US to B plantar surface followed by cupping. Held on tape as pt is going to [place] on Monday.   Progress per Plan of Care         Timed:         Manual Therapy:    30     mins  20299;     Therapeutic Exercise:         mins  32695;     Neuromuscular Mayte:        mins  85680;    Therapeutic Activity:          mins  85229;     Gait Training:           mins  19509;     Ultrasound:     10     mins  88802;    Ionto                                   mins   43054  Self Care                            mins   15317  Canalith Repos                   mins  72822  Tests & Measures              mins   44460      Un-Timed:  Electrical Stimulation:         mins  72855 ( );  Dry Needling          mins 59499/  Traction          mins 79710  Low Eval          Mins  26227  Mod Eval          Mins  18647  High Eval                            Mins  92412  Re-Eval                               mins  17248    Timed Treatment:   40   mins   Total Treatment:     40   mins    Tyra Sheldon  PT, DPT, cert. DN  Physical Therapist  [place] Lic # [id number]

## 2023-02-27 ENCOUNTER — TREATMENT (OUTPATIENT)
Dept: PHYSICAL THERAPY | Facility: CLINIC | Age: 38
End: 2023-02-27
Payer: MEDICAID

## 2023-02-27 DIAGNOSIS — M79.672 BILATERAL FOOT PAIN: Primary | ICD-10-CM

## 2023-02-27 DIAGNOSIS — M72.2 BILATERAL PLANTAR FASCIITIS: ICD-10-CM

## 2023-02-27 DIAGNOSIS — M79.671 BILATERAL FOOT PAIN: Primary | ICD-10-CM

## 2023-02-27 PROCEDURE — 97140 MANUAL THERAPY 1/> REGIONS: CPT | Performed by: PHYSICAL THERAPIST

## 2023-02-27 PROCEDURE — 97035 APP MDLTY 1+ULTRASOUND EA 15: CPT | Performed by: PHYSICAL THERAPIST

## 2023-02-27 NOTE — PROGRESS NOTES
Physical Therapy Daily Treatment Note      Patient: Wanda Doe   : 1985  Diagnosis/ICD-10 Code:  Bilateral foot pain [M79.671, M79.672]  Referring practitioner: Li Peck, *  Date of Initial Visit: Type: THERAPY  Noted: 2023  Today's Date: 2023  Patient seen for 5 sessions         Wanda Doe reports: last visit went well and states she went back and got her steroid injections done again but this time by Dr. Sierra and they did better she states it hurt more during but provided more relief after. Pt. States she will be leaving for [place] this evening.    Objective   See Exercise, Manual, and Modality Logs for complete treatment.     Assessment/Plan  Pt. responds well to manual intervention and modalities at this time as p. Begins to have more relief. Pt. Will benefit form return to strengthening next to reinforce relief and build good foundation of strength for improved tolerance to gait and standing with work.    Goals  Plan Goals: Short term:   1. Pt will demonstrate at least 10 deg B ankle DF to improve gait pattern to tolerate at least 30 min walking in grocery.  2. Pt will amb w/normal toe off and absence supination in standing for working at least 2 hours with </= 3/10 L heel pain.     LTG to be met in 12 wks  1. Pt will amb independently x at least 30  Min for short community negotiation to reach PLOF.  2. Pt will report at least 80% PLOF to reflect improved participation.   3. Pt will demonstrate standing at least 30 min with no greater than 3/10 L heel pain for work as .  4. Pt will demonstrate at least 4+/5 L hip abd strength to reduce abnormal jt stresses and normalize gait pattern to tolerate work duties as .   5. Pt will be safe, compliant, and indep with HEP for improved self management of symptoms or dysfunction.  6. Pt will improve FAAM from 60-79% impairment to no greater than 1-19% impairment.     Progress strengthening /stabilization  /functional activity           Timed:         Manual Therapy:    30     mins  80600;       Ultrasound:     10     mins  04884;          Timed Treatment:   40   mins   Total Treatment:     40   mins    Pauly Triplett PTA  Physical Therapist Assistant License #[id number]

## 2023-03-07 ENCOUNTER — TELEPHONE (OUTPATIENT)
Dept: PHYSICAL THERAPY | Facility: CLINIC | Age: 38
End: 2023-03-07

## 2023-03-07 NOTE — TELEPHONE ENCOUNTER
Caller: Wanda Doe    Relationship: Self       What was the call regarding: HAS BEEN SICK SINCE VAC

## 2023-03-09 ENCOUNTER — TREATMENT (OUTPATIENT)
Dept: PHYSICAL THERAPY | Facility: CLINIC | Age: 38
End: 2023-03-09
Payer: MEDICAID

## 2023-03-09 DIAGNOSIS — M79.671 BILATERAL FOOT PAIN: Primary | ICD-10-CM

## 2023-03-09 DIAGNOSIS — M79.672 BILATERAL FOOT PAIN: Primary | ICD-10-CM

## 2023-03-09 DIAGNOSIS — M72.2 BILATERAL PLANTAR FASCIITIS: ICD-10-CM

## 2023-03-09 PROCEDURE — 97035 APP MDLTY 1+ULTRASOUND EA 15: CPT | Performed by: PHYSICAL THERAPIST

## 2023-03-09 PROCEDURE — 97110 THERAPEUTIC EXERCISES: CPT | Performed by: PHYSICAL THERAPIST

## 2023-03-09 PROCEDURE — 97140 MANUAL THERAPY 1/> REGIONS: CPT | Performed by: PHYSICAL THERAPIST

## 2023-03-09 NOTE — PROGRESS NOTES
Physical Therapy Daily Treatment Note      Patient: Wanda Doe   : 1985  Diagnosis/ICD-10 Code:  Bilateral foot pain [M79.671, M79.672]  Referring practitioner: Li Peck, *  Date of Initial Visit: Type: THERAPY  Noted: 2023  Today's Date: 3/9/2023  Patient seen for 6 sessions         Wanda Doe reports: her feet having been doing really well not having pian and states she is actually wondering if the cortisone shot is working too good and she's going to mess her feet up more while she feels better. Pt. states she has a little bit of L shin pin today which is different.    Objective   See Exercise, Manual, and Modality Logs for complete treatment.     Assessment/Plan  Pt. Tolerates treatment well this date experiencing no foot pain Pt. struggles more with shin pain this date and a secondary issue of nasal congestion and eye irritation. Pt. Was not taped today due to good response without thus far but can be applied at future sessions as needed.     Goals  Plan Goals: Short term:   1. Pt will demonstrate at least 10 deg B ankle DF to improve gait pattern to tolerate at least 30 min walking in grocery.  2. Pt will amb w/normal toe off and absence supination in standing for working at least 2 hours with </= 3/10 L heel pain.     LTG to be met in 12 wks  1. Pt will amb independently x at least 30  Min for short community negotiation to reach PLOF.  2. Pt will report at least 80% PLOF to reflect improved participation.   3. Pt will demonstrate standing at least 30 min with no greater than 3/10 L heel pain for work as .  4. Pt will demonstrate at least 4+/5 L hip abd strength to reduce abnormal jt stresses and normalize gait pattern to tolerate work duties as .   5. Pt will be safe, compliant, and indep with HEP for improved self management of symptoms or dysfunction.  6. Pt will improve FAAM from 60-79% impairment to no greater than 1-19% impairment.         Progress strengthening /stabilization /functional activity           Timed:         Manual Therapy:    25     mins  16650;     Therapeutic Exercise:    15     mins  89631;     Ultrasound:     10     mins  74511;          Timed Treatment:   50   mins   Total Treatment:     50   mins    Pauly Triplett PTA  Physical Therapist Assistant License #[id number]

## 2023-03-14 ENCOUNTER — TREATMENT (OUTPATIENT)
Dept: PHYSICAL THERAPY | Facility: CLINIC | Age: 38
End: 2023-03-14
Payer: MEDICAID

## 2023-03-14 DIAGNOSIS — M79.671 BILATERAL FOOT PAIN: Primary | ICD-10-CM

## 2023-03-14 DIAGNOSIS — M79.672 BILATERAL FOOT PAIN: Primary | ICD-10-CM

## 2023-03-14 DIAGNOSIS — M72.2 BILATERAL PLANTAR FASCIITIS: ICD-10-CM

## 2023-03-14 PROCEDURE — 97140 MANUAL THERAPY 1/> REGIONS: CPT | Performed by: PHYSICAL THERAPIST

## 2023-03-14 PROCEDURE — 97035 APP MDLTY 1+ULTRASOUND EA 15: CPT | Performed by: PHYSICAL THERAPIST

## 2023-03-14 PROCEDURE — 97110 THERAPEUTIC EXERCISES: CPT | Performed by: PHYSICAL THERAPIST

## 2023-03-14 NOTE — PROGRESS NOTES
Physical Therapy Daily Treatment Note      Patient: Wanda Doe   : 1985  Diagnosis/ICD-10 Code:  Bilateral foot pain [M79.671, M79.672]  Referring practitioner: Li Peck, *  Date of Initial Visit: Type: THERAPY  Noted: 2023  Today's Date: 3/14/2023  Patient seen for 7 sessions         Wanda Doe reports: she is still doing better but the shin sensation continues to worry her stating she now has a cold/numb sensation that runs up the L shin on the Lateral aspect of the tibia and stretching it makes it fele good. P.t also wonders if this shin sensation relates to her inability to over her fifth digit of her L foot.    Objective   See Exercise, Manual, and Modality Logs for complete treatment.     Assessment/Plan   Pt. Tolerates treatment well today and has good response to cupping this date increasing circulation and releasing fascial tension in B gastroc region. Pt. continues to have decreased pain in feet and calves.    Goals  Plan Goals: Short term:   1. Pt will demonstrate at least 10 deg B ankle DF to improve gait pattern to tolerate at least 30 min walking in grocery.  2. Pt will amb w/normal toe off and absence supination in standing for working at least 2 hours with </= 3/10 L heel pain.     LTG to be met in 12 wks  1. Pt will amb independently x at least 30  Min for short community negotiation to reach PLOF.  2. Pt will report at least 80% PLOF to reflect improved participation.   3. Pt will demonstrate standing at least 30 min with no greater than 3/10 L heel pain for work as .  4. Pt will demonstrate at least 4+/5 L hip abd strength to reduce abnormal jt stresses and normalize gait pattern to tolerate work duties as .   5. Pt will be safe, compliant, and indep with HEP for improved self management of symptoms or dysfunction.  6. Pt will improve FAAM from 60-79% impairment to no greater than 1-19% impairment.     Progress strengthening  /stabilization /functional activity           Timed:         Manual Therapy:    15     mins  54974;     Therapeutic Exercise:    15     mins  54570;     Ultrasound:     10     mins  94924;      Timed Treatment:   40   mins   Total Treatment:     40   mins    Pauly Triplett PTA  Physical Therapist Assistant License #[id number]

## 2023-03-16 ENCOUNTER — TREATMENT (OUTPATIENT)
Dept: PHYSICAL THERAPY | Facility: CLINIC | Age: 38
End: 2023-03-16
Payer: MEDICAID

## 2023-03-16 DIAGNOSIS — M72.2 BILATERAL PLANTAR FASCIITIS: ICD-10-CM

## 2023-03-16 DIAGNOSIS — M79.671 BILATERAL FOOT PAIN: Primary | ICD-10-CM

## 2023-03-16 DIAGNOSIS — M79.672 BILATERAL FOOT PAIN: Primary | ICD-10-CM

## 2023-03-16 PROCEDURE — 97112 NEUROMUSCULAR REEDUCATION: CPT | Performed by: PHYSICAL THERAPIST

## 2023-03-16 PROCEDURE — 97035 APP MDLTY 1+ULTRASOUND EA 15: CPT | Performed by: PHYSICAL THERAPIST

## 2023-03-16 PROCEDURE — 97110 THERAPEUTIC EXERCISES: CPT | Performed by: PHYSICAL THERAPIST

## 2023-03-16 PROCEDURE — 97140 MANUAL THERAPY 1/> REGIONS: CPT | Performed by: PHYSICAL THERAPIST

## 2023-03-16 NOTE — PROGRESS NOTES
Physical Therapy Daily Treatment Note      Patient: Wanda Doe   : 1985  Diagnosis/ICD-10 Code:  Bilateral foot pain [M79.671, M79.672]  Referring practitioner: Li Peck, *  Date of Initial Visit: Type: THERAPY  Noted: 2023  Today's Date: 3/16/2023  Patient seen for 8 sessions         Wanda Doe reports: she continues to have no pain in the feet and states the shots have been extremely effective but on that same note worries she may be doing too much while the pain is gone and is fearful of doing more harm than good.    Objective   See Exercise, Manual, and Modality Logs for complete treatment.     Assessment/Plan   Pt. Tolerates treatment well this visit and continues to have good response to manual treatment. This visit exercise was progressed to include more foot and ankle strengthening. Pt. performs towel scrunches with toes and was surprised to se her L LE 5th digit moving when performing these scrunches.    Goals  Plan Goals: Short term:   1. Pt will demonstrate at least 10 deg B ankle DF to improve gait pattern to tolerate at least 30 min walking in grocery.  2. Pt will amb w/normal toe off and absence supination in standing for working at least 2 hours with </= 3/10 L heel pain.     LTG to be met in 12 wks  1. Pt will amb independently x at least 30  Min for short community negotiation to reach PLOF.  2. Pt will report at least 80% PLOF to reflect improved participation.   3. Pt will demonstrate standing at least 30 min with no greater than 3/10 L heel pain for work as .  4. Pt will demonstrate at least 4+/5 L hip abd strength to reduce abnormal jt stresses and normalize gait pattern to tolerate work duties as .   5. Pt will be safe, compliant, and indep with HEP for improved self management of symptoms or dysfunction.  6. Pt will improve FAAM from 60-79% impairment to no greater than 1-19% impairment.     Progress strengthening /stabilization  /functional activity      Timed:         Manual Therapy:    15     mins  24200;     Therapeutic Exercise:    15     mins  38327;     Neuromuscular Mayte:    10    mins  52168;       Ultrasound:     10     mins  83849;        Timed Treatment:   45   mins   Total Treatment:     45   mins    Pauly Triplett PTA  Physical Therapist Assistant License #[id number]

## 2023-03-21 ENCOUNTER — TELEPHONE (OUTPATIENT)
Dept: PHYSICAL THERAPY | Facility: CLINIC | Age: 38
End: 2023-03-21

## 2023-03-30 ENCOUNTER — DOCUMENTATION (OUTPATIENT)
Dept: PHYSICAL THERAPY | Facility: CLINIC | Age: 38
End: 2023-03-30

## 2023-03-30 DIAGNOSIS — M72.2 BILATERAL PLANTAR FASCIITIS: ICD-10-CM

## 2023-03-30 DIAGNOSIS — M79.672 BILATERAL FOOT PAIN: Primary | ICD-10-CM

## 2023-03-30 DIAGNOSIS — M79.671 BILATERAL FOOT PAIN: Primary | ICD-10-CM

## 2023-03-30 NOTE — PROGRESS NOTES
Discharge Summary  Discharge Summary from Physical Therapy Report                               [address], [address], [place]  [address]    Dates  PT visit: 2/2/23-3/16/23  Number of Visits: 9     Discharge Status of Patient: See Progress Note dated pt did not return for DC/PN    Goals: All Met    Discharge Plan: Continue with current home exercise program as instructed        Date of Discharge 3/30/23        Tyra Sheldon, PT, DPT, cert. DN  Physical Therapist  [place] Lic# [id number]

## 2023-05-14 NOTE — TELEPHONE ENCOUNTER
Caller: Wanda Doe    Relationship: Self    Best call back number: [phone]    What medication are you requesting: ANTI VIRAL MEDICATION    What are your current symptoms: COLD SORE ABOVE LIP    How long have you been experiencing symptoms: THREE MONTHS RIGHT BEFORE PERIOD    Have you had these symptoms before:    [x] Yes  [] No    Have you been treated for these symptoms before:   [x] Yes  [] No    If a prescription is needed, what is your preferred pharmacy and phone number:     [id number] - [address] AT [address]  &  - [phone] [facility] [phone] FX     Additional notes:  PATIENT IS WANTING TO TRY SOMETHING STRONGER THE OTC ABREVA IS NOT HELPING THIS TIME.        
I sent in Famciclovir for her to try. She will take 1500 mg (three 500 mg pills) as a one time dose. She needs to take it at the earliest sign of a cold sore coming on.   
Patient advised, she said she is going to hold off due to being 4 days in with the current cold sore she's going to save the medication for the next cold sore.   
no

## 2023-11-10 NOTE — TELEPHONE ENCOUNTER
She needs to schedule f/up appointment on anxiety/depression. At July appt I asked to see Psychiatry or f/up with me in 6 weeks.    10-Nov-2023 16:24

## 2024-06-19 ENCOUNTER — OFFICE VISIT (OUTPATIENT)
Dept: PODIATRY | Facility: CLINIC | Age: 39
End: 2024-06-19
Payer: MEDICAID

## 2024-06-19 VITALS — HEIGHT: 66 IN | WEIGHT: 150 LBS | HEART RATE: 65 BPM | BODY MASS INDEX: 24.11 KG/M2

## 2024-06-19 DIAGNOSIS — G89.29 CHRONIC PAIN OF LEFT KNEE: ICD-10-CM

## 2024-06-19 DIAGNOSIS — M79.672 LEFT FOOT PAIN: Primary | ICD-10-CM

## 2024-06-19 DIAGNOSIS — M72.2 PLANTAR FASCIITIS, LEFT: ICD-10-CM

## 2024-06-19 DIAGNOSIS — M25.562 CHRONIC PAIN OF LEFT KNEE: ICD-10-CM

## 2024-06-19 DIAGNOSIS — M21.862 ACQUIRED POSTERIOR EQUINUS, LEFT: ICD-10-CM

## 2024-06-19 RX ORDER — TRIAMCINOLONE ACETONIDE 40 MG/ML
20 INJECTION, SUSPENSION INTRA-ARTICULAR; INTRAMUSCULAR ONCE
Status: COMPLETED | OUTPATIENT
Start: 2024-06-19 | End: 2024-06-19

## 2024-06-19 RX ADMIN — TRIAMCINOLONE ACETONIDE 20 MG: 40 INJECTION, SUSPENSION INTRA-ARTICULAR; INTRAMUSCULAR at 12:45

## 2024-06-20 NOTE — PROGRESS NOTES
06/19/2024  Foot and Ankle Surgery - Established Patient/Follow-up  Provider: Dr. Be Sierra DPM  Location: [facility]    Subjective:  Wanda Doe is a 38 y.o. female.     Chief Complaint   Patient presents with    Left Foot - Follow-up, Pain     Pain 7    Follow-up     Last saw ESTHELA Leiva 4/1/2024       History of Present Illness  The patient presents for evaluation of left foot and heel pain.    The patient was last evaluated in 02/2024 for left foot and heel pain. Currently, he reports persistent pain in his left foot and heel. His occupation requires prolonged standing, necessitating the use of a mat for support. His footwear primarily consists of flip-flops, and he occasionally wears tennis shoes during outdoor walks. He has been engaging in stretching exercises, each morning and evening for a minimum of 20 minutes, and has previously utilized a foot brace. He expresses concern about the potential re-bracement of his foot.    The patient also reports experiencing knee pain, a condition he has previously experienced. He describes a sensation of popping in his knee, particularly when flexing it back and forth. He has not sought medical attention for his knee, although he has previously sought treatment in this facility. He describes a sensation akin to a rubber band in his knee. He recalls a fall resulting in knee injuries twice weekly, leading to frequent falls and running into objects. He also experiences numbness, which he attributes to his plantar fasciitis, leading to a feeling of instability.      No Known Allergies    Current Outpatient Medications on File Prior to Visit   Medication Sig Dispense Refill    clonazePAM (KlonoPIN) 0.5 MG tablet Take 1 tablet by mouth Daily.      desvenlafaxine (PRISTIQ) 100 MG 24 hr tablet TAKE 1 TABLET BY MOUTH DAILY 90 tablet 1    fluticasone (FLONASE) 50 MCG/ACT nasal spray 2 sprays by Each Nare route Daily. Shake well before using.       "hydrocortisone 2.5 % ointment APPLY TOPICALLY TO EYE LIDS TWICE DAILY      Linzess 145 MCG capsule capsule TAKE 1 CAPSULE BY MOUTH EVERY DAY ON AN EMPTY STOMACH 30 MINUTES PRIOR TO FIRST MEAL OF THE DAY (Patient taking differently: Take 72 mcg by mouth Every Morning Before Breakfast.) 90 capsule 0    albuterol sulfate  (90 Base) MCG/ACT inhaler Inhale 2 puffs Every 4 (Four) Hours As Needed. (Patient not taking: Reported on 6/19/2024)      oxybutynin XL (DITROPAN-XL) 10 MG 24 hr tablet Take 1 tablet by mouth Daily. (Patient not taking: Reported on 6/19/2024)       No current facility-administered medications on file prior to visit.       Objective   Pulse 65   Ht 167.6 cm (66\")   Wt 68 kg (150 lb)   BMI 24.21 kg/m²     Foot/Ankle Exam  Physical Exam   General:   Appearance: appears stated age and healthy    Orientation: AAOx3    Affect: appropriate       VASCULAR       Right Foot Vascularity   Normal vascular exam    Dorsalis pedis:  2+  Posterior tibial:  2+  Skin Temperature: warm    Edema Grading:  None  CFT:  < 3 seconds  Pedal Hair Growth:  Present  Varicosities: none        Left Foot Vascularity   Normal vascular exam    Dorsalis pedis:  2+  Posterior tibial:  2+  Skin Temperature: warm    Edema Grading:  None  CFT:  < 3 seconds  Pedal Hair Growth:  Present  Varicosities: none        MUSCULOSKELETAL       Right Foot Musculoskeletal   Ecchymosis:  None  Tenderness: none    Arch:  Pes planus      Left Foot Musculoskeletal   Ecchymosis:  None  Tenderness: none    Arch:  Pes planus      MUSCLE STRENGTH      Right Foot Muscle Strength   Normal strength    Foot dorsiflexion:  5  Foot plantar flexion:  5  Foot inversion:  5  Foot eversion:  5      DERMATOLOGIC      Right Foot Dermatologic   Skin: skin intact    Nails: normal        Left Foot Dermatologic   Skin: skin intact    Nails: normal        Right Foot Additional Comments Pain with palpation to plantar heel      Left Foot Additional Comments: Pain with " palpation to plantar heel     01/09/2022: Pain and palpation to plantar heel. Inflammation noted.     02/23/2023: Continued discomfort with palpation involving the plantar medial calcaneal tuberosity of the left heel. No gross deformity, instability. Mild equinus contracture with knee extended and flexed.    6/19/24: Pain with palpation to the plantar aspect of the left heel.  Continued equinus contracture with knee extended and flexed.  No progressive deformity or instability.      Results      Assessment & Plan   Diagnoses and all orders for this visit:    1. Left foot pain (Primary)  -     XR Foot 3+ View Left  -     Ambulatory Referral to Physical Therapy for Evaluation & Treatment  -     triamcinolone acetonide (KENALOG-40) injection 20 mg    2. Plantar fasciitis, left  -     Ambulatory Referral to Physical Therapy for Evaluation & Treatment  -     triamcinolone acetonide (KENALOG-40) injection 20 mg    3. Acquired posterior equinus, left    4. Chronic pain of left knee      Assessment & Plan    The patient exhibits significant guarding and tightness in the left foot and heel, a condition typically associated with overuse. The patient was counseled to maintain regular footwear, such as flip-flops, sandals, and flats, and to utilize shoe inserts. The importance of stretching exercises was emphasized. A steroid injection was suggested to alleviate some of the symptoms. The patient was also advised to engage in low-impact exercises such as walking. A referral was made to Dr. Salas for further evaluation of the knee. A referral for physical therapy was made. The patient was also advised to avoid high-impact activities such as biking and elliptical machine, and to ensure the knee is straight during exercises as tolerated. Foot rolling and a night splint were also suggested. Should the patient's symptoms persist, an MRI may be considered.  Greater than 20 minutes spent before, during, and after evaluation for patient  care    Plantar Fascia Steroid Injection: Left    Consent and time out was performed before proceeding with the procedure.  The area of maximal tenderness was palpated near the plantar medial calcaneal tuberosity of left foot.  The area was cleansed with alcohol.  Under ultrasound guidance, the plantar fascia was visualized and a solution totaling 1.5 mL was injected about the origin of the plantar fascia.  The solution contained 0.5 mL of 1% lidocaine plain, 0.5 mL of 0.5% Marcaine plain, and 0.5 mL of Kenalog.  After the injection, the patient noted immediate pain relief.  Mild compression was placed at the injection site followed by a sterile bandage.  The patient tolerated the injection well without complication.             Patient or patient representative verbalized consent for the use of Ambient Listening during the visit with  DURAN Sierra DPM for chart documentation. 6/20/2024  07:02 EDT    DURAN Sierra DPM

## 2024-07-29 ENCOUNTER — TREATMENT (OUTPATIENT)
Dept: PHYSICAL THERAPY | Facility: CLINIC | Age: 39
End: 2024-07-29
Payer: MEDICAID

## 2024-07-29 DIAGNOSIS — M79.672 LEFT FOOT PAIN: ICD-10-CM

## 2024-07-29 DIAGNOSIS — M72.2 PLANTAR FASCIITIS, LEFT: Primary | ICD-10-CM

## 2024-07-29 PROCEDURE — 97110 THERAPEUTIC EXERCISES: CPT | Performed by: PHYSICAL THERAPIST

## 2024-07-29 PROCEDURE — 97161 PT EVAL LOW COMPLEX 20 MIN: CPT | Performed by: PHYSICAL THERAPIST

## 2024-07-29 PROCEDURE — 97035 APP MDLTY 1+ULTRASOUND EA 15: CPT | Performed by: PHYSICAL THERAPIST

## 2024-07-29 PROCEDURE — 97112 NEUROMUSCULAR REEDUCATION: CPT | Performed by: PHYSICAL THERAPIST

## 2024-07-29 NOTE — PROGRESS NOTES
Physical Therapy Initial Evaluation and Plan of Care  [address], [address], [place]  [address]    Patient: Wanda Doe   : 1985  Diagnosis/ICD-10 Code:  Plantar fasciitis, left [M72.2]  Referring practitioner: DURAN Sierra DPM  Date of Initial Visit: 2024  Today's Date: 2024  Patient seen for 1 sessions           Subjective Questionnaire: FAAM indicates 54% impairment      Subjective Evaluation    History of Present Illness  Mechanism of injury: Patient is a 39 y.o. WF who returns to PT with recurrence of left plantar fasciitis that started back in April but couldn't get in to MD until .  Cortisone injection worked for about 10 days then came right back.  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  Pain = 7/10 and increases with AM stiffness, prolonged standing and walking, jogging.  Pain decreases with ice, wearing walking boot, massage, orthotics with tennis shoes. MD recommended avoiding walking and standing if possible, using recumbent equipment for exercise temporarily.      Patient Goals  Patient goals for therapy: decreased pain, increased motion, improved balance, increased strength and return to sport/leisure activities             Objective FAAM function score indicates 54% impairment with limitations in standing, working, AM stiffness, walking. SLS = 30 sec B, more difficulty on L.  Repeated sit to stand = 8 reps in 30 sec without arms (36 sec for 10 reps).  Ambulates with antalgia on L.  Moderately tight gastroc L.  Patient reports NT L 5th toe, extending FPC up shin on L.  Tenderness L plantar fascia.        Assessment & Plan       Assessment  Impairments: abnormal gait, abnormal or restricted ROM, activity intolerance, impaired balance, impaired physical strength, lacks appropriate home exercise program, pain with function and weight-bearing intolerance   Functional limitations: carrying objects, lifting, walking, uncomfortable because of pain,  standing and stooping     Goals  Plan Goals: Patient independent with HEP in 2 weeks  Tolerate 10 min Nustep in 2 weeks  Able to return to walking without minimal pain in 2 weeks  Able to ambulate independently without AD and minimal antalgia in 2 weeks  Improve function as evidenced by FAAM score of 20% or less in 12 weeks (54% impairment initially)  Able to return to jogging, [employer] normal exercise routine in 12 weeks  Perform 10 reps of repeated sit to stand without arms in 30 seconds in 12 weeks      Plan  Therapy options: will be seen for skilled therapy services  Planned modality interventions: ultrasound  Other planned modality interventions: physical modalities as needed, cupping, etc  Planned therapy interventions: balance/weight-bearing training, flexibility, functional ROM exercises, home exercise program, gait training, manual therapy, neuromuscular re-education, postural training, strengthening, stretching and therapeutic activities  Frequency: 2x week  Duration in weeks: 12  Treatment plan discussed with: patient        Timed:         Manual Therapy:         mins  37053;     Therapeutic Exercise:    15     mins  90140;     Neuromuscular Mayte:    15    mins  31085;    Therapeutic Activity:          mins  56782;     Gait Training:           mins  45030;     Ultrasound:     10     mins  38669;    Self-care  ____ mins 96894    Un-Timed:  Electrical Stimulation:         mins  67612 (MC );  Dry Needling          mins self-pay 43093  Traction          mins 47821  Low Eval     20     Mins  98243  Mod Eval          Mins  26470  Canal repositioning _____ mins  31914        Timed Treatment:   40   mins   Total Treatment:     60   mins    PT SIGNATURE: Robin A Sprigler, PT   [place] license # [id number]  Electronically signed by Robin A Sprigler, PT, 07/29/24, 2:25 PM EDT    Initial Certification  Certification Period: 7/29/2024 through 10/26/2024  I certify that the therapy services are furnished while this patient  is under my care.  The services outlined above are required by this patient, and will be reviewed every 90 days.     PHYSICIAN: DURAN Sierra, VIJAY ______________________________________________________________________________________________     DATE: _________________________________________________________________________________________________________________________________    Please sign and return via fax to [phone]. Thank you, [facility].

## 2024-07-31 ENCOUNTER — TREATMENT (OUTPATIENT)
Dept: PHYSICAL THERAPY | Facility: CLINIC | Age: 39
End: 2024-07-31
Payer: MEDICAID

## 2024-07-31 DIAGNOSIS — M79.672 LEFT FOOT PAIN: ICD-10-CM

## 2024-07-31 DIAGNOSIS — M72.2 PLANTAR FASCIITIS, LEFT: Primary | ICD-10-CM

## 2024-07-31 NOTE — PROGRESS NOTES
Physical Therapy Daily Treatment Note      Patient: Wanda Doe   : 1985  Diagnosis/ICD-10 Code:  Plantar fasciitis, left [M72.2]  Referring practitioner: DURAN Sierra DPM  Date of Initial Visit: Type: THERAPY  Noted: 2024  Today's Date: 2024  Patient seen for 2 sessions         Wanda Doe reports: her pain is specifically at the heel today and she states she responded very well ~ 1 year ago when she has came to therapy and is hopeful we can help her again.    Objective   See Exercise, Manual, and Modality Logs for complete treatment.     Assessment/Plan  Pt. Tolerates treatment well this visit and has good response to ultrasound and cupping this date. STM's to calf revealed some tightness and some small TP's however most tightness was noted at heel cord this date.    Goals  Plan Goals: Patient independent with HEP in 2 weeks  Tolerate 10 min Nustep in 2 weeks  Able to return to walking without minimal pain in 2 weeks  Able to ambulate independently without AD and minimal antalgia in 2 weeks  Improve function as evidenced by FAAM score of 20% or less in 12 weeks (54% impairment initially)  Able to return to [employer] normal exercise routine in 12 weeks  Perform 10 reps of repeated sit to stand without arms in 30 seconds in 12 weeks    Progress strengthening /stabilization /functional activity           Timed:         Manual Therapy:    15     mins  84916;     Therapeutic Exercise:    10     mins  47103;     Ultrasound:     8     mins  12641;        Timed Treatment:   33   mins   Total Treatment:     33   mins    Pauly Triplett PTA  Physical Therapist Assistant License #[id number]

## 2024-08-02 ENCOUNTER — TREATMENT (OUTPATIENT)
Dept: PHYSICAL THERAPY | Facility: CLINIC | Age: 39
End: 2024-08-02
Payer: MEDICAID

## 2024-08-02 DIAGNOSIS — M72.2 PLANTAR FASCIITIS, LEFT: Primary | ICD-10-CM

## 2024-08-02 DIAGNOSIS — M79.672 LEFT FOOT PAIN: ICD-10-CM

## 2024-08-02 NOTE — PROGRESS NOTES
Physical Therapy Daily Treatment Note      Patient: Wanda Doe   : 1985  Diagnosis/ICD-10 Code:  Plantar fasciitis, left [M72.2]  Referring practitioner: DURAN Sierra DPM  Date of Initial Visit: Type: THERAPY  Noted: 2024  Today's Date: 2024  Patient seen for 3 sessions         Wanda Doe reports: she felt good after last session but it was short lived. Pt. Also reports she did well with K-tape last time and would like to try it again this round of therapy.    Objective   See Exercise, Manual, and Modality Logs for complete treatment.     Assessment/Plan  Pt. Responds well to manual activity and stretch today . Pt. responds best to heel cord stretch via heel distraction stating that felt good. Pt. was taped for heel support at 30% tension up achilles and a 50 tension stabilizing strip in mid foot for plantar fascia.    Goals  Plan Goals: Patient independent with HEP in 2 weeks  Tolerate 10 min Nustep in 2 weeks  Able to return to walking without minimal pain in 2 weeks  Able to ambulate independently without AD and minimal antalgia in 2 weeks  Improve function as evidenced by FAAM score of 20% or less in 12 weeks (54% impairment initially)  Able to return to jogging, [employer] normal exercise routine in 12 weeks  Perform 10 reps of repeated sit to stand without arms in 30 seconds in 12 weeks       Progress strengthening /stabilization /functional activity           Timed:         Manual Therapy:    10     mins  87631;     Therapeutic Exercise:    10     mins  81488;     Ultrasound:     10     mins  81899;        Timed Treatment:   30   mins   Total Treatment:     30   mins    Pauly Triplett PTA  Physical Therapist Assistant License #[id number]

## 2024-08-08 ENCOUNTER — TREATMENT (OUTPATIENT)
Dept: PHYSICAL THERAPY | Facility: CLINIC | Age: 39
End: 2024-08-08
Payer: MEDICAID

## 2024-08-08 DIAGNOSIS — M72.2 PLANTAR FASCIITIS, LEFT: Primary | ICD-10-CM

## 2024-08-08 DIAGNOSIS — M79.672 LEFT FOOT PAIN: ICD-10-CM

## 2024-08-08 NOTE — PROGRESS NOTES
Physical Therapy Daily Treatment Note      Patient: Wanda Doe   : 1985  Diagnosis/ICD-10 Code:  Plantar fasciitis, left [M72.2]   Problems Addressed this Visit    None  Visit Diagnoses       Plantar fasciitis, left    -  Primary    Left foot pain              Diagnoses         Codes Comments    Plantar fasciitis, left    -  Primary ICD-10-CM: M72.2  ICD-9-CM: 728.71     Left foot pain     ICD-10-CM: M79.672  ICD-9-CM: 729.5           Referring practitioner: DURAN Sierra DPM  Date of Initial Visit: Type: THERAPY  Noted: 2024  Today's Date: 2024    VISIT#: 4    Subjective : Wanda reports that the taping really seemed to help last time, especially the first day. Tape stayed on for 3 days. Requests PT to tape again today.    Objective :     See Exercise, Manual, and Modality Logs for complete treatment.     Assessment/Plan : Good response to kinesiotaping. Decreased pain post-tx. Needs continued gastroc stretching and progression of strengthening.     Progress per Plan of Care and Progress strengthening /stabilization /functional activity         Timed:         Manual Therapy:    15     mins  60113;     Therapeutic Exercise:    15     mins  87049;     Neuromuscular Mayte:        mins  23688;    Therapeutic Activity:          mins  58679;     Gait Training:           mins  58078;     Ultrasound:     8     mins  92559;    Ionto                                   mins   88124  Self Care                            mins   32266    Un-Timed:  Electrical Stimulation:         mins  73852 ( );  Dry Needling          mins 52905/99761  Traction          mins 09218  Canalith Repos                   mins  82707  Low Eval          Mins  93681  Mod Eval          Mins  71175  High Eval                            Mins  08328  Re-Eval                               mins  18052    Timed Treatment:   38   mins   Total Treatment:     38   mins    Lena Cartagena, PT, CLT, Cert DN  Physical Therapist  [place] Lic  # [id number]

## 2024-08-12 ENCOUNTER — TREATMENT (OUTPATIENT)
Dept: PHYSICAL THERAPY | Facility: CLINIC | Age: 39
End: 2024-08-12
Payer: MEDICAID

## 2024-08-12 DIAGNOSIS — M79.672 LEFT FOOT PAIN: ICD-10-CM

## 2024-08-12 DIAGNOSIS — M72.2 PLANTAR FASCIITIS, LEFT: Primary | ICD-10-CM

## 2024-08-12 PROCEDURE — 97140 MANUAL THERAPY 1/> REGIONS: CPT | Performed by: PHYSICAL THERAPIST

## 2024-08-12 PROCEDURE — 97110 THERAPEUTIC EXERCISES: CPT | Performed by: PHYSICAL THERAPIST

## 2024-08-12 PROCEDURE — 97035 APP MDLTY 1+ULTRASOUND EA 15: CPT | Performed by: PHYSICAL THERAPIST

## 2024-08-12 NOTE — PROGRESS NOTES
Physical Therapy Daily Treatment Note      Patient: Wanda Doe   : 1985  Diagnosis/ICD-10 Code:  Plantar fasciitis, left [M72.2]   Problems Addressed this Visit    None  Visit Diagnoses       Plantar fasciitis, left    -  Primary    Left foot pain              Diagnoses         Codes Comments    Plantar fasciitis, left    -  Primary ICD-10-CM: M72.2  ICD-9-CM: 728.71     Left foot pain     ICD-10-CM: M79.672  ICD-9-CM: 729.5           Referring practitioner: DURAN Sierra DPM  Date of Initial Visit: Type: THERAPY  Noted: 2024  Today's Date: 2024    VISIT#: 5    Subjective : Wanda reports her calf has been feeling better. Still having tightness and pain in arch of foot.    Objective :     See Exercise, Manual, and Modality Logs for complete treatment.     Assessment/Plan : Decreased pain at start of session. Good response to kinesiotaping. Decreased pain post-tx. Needs continued gastroc stretching and progression of strengthening.     Progress per Plan of Care and Progress strengthening /stabilization /functional activity         Timed:         Manual Therapy:    15     mins  50724;     Therapeutic Exercise:    15     mins  55495;     Neuromuscular Mayte:        mins  40802;    Therapeutic Activity:          mins  61717;     Gait Training:           mins  49997;     Ultrasound:     8     mins  86174;    Ionto                                   mins   00419  Self Care                            mins   85153    Un-Timed:  Electrical Stimulation:         mins  06866 ( );  Dry Needling          mins 81640/99178  Traction          mins 41351  Canalith Repos                   mins  86334  Low Eval          Mins  93086  Mod Eval          Mins  27702  High Eval                            Mins  56835  Re-Eval                               mins  79748    Timed Treatment:   38   mins   Total Treatment:     38   mins    Lena Cartagena, PT, CLT, Cert DN  Physical Therapist  [place] Lic # [id number]

## 2024-08-14 ENCOUNTER — TREATMENT (OUTPATIENT)
Dept: PHYSICAL THERAPY | Facility: CLINIC | Age: 39
End: 2024-08-14
Payer: MEDICAID

## 2024-08-14 DIAGNOSIS — M79.672 LEFT FOOT PAIN: ICD-10-CM

## 2024-08-14 DIAGNOSIS — M72.2 PLANTAR FASCIITIS, LEFT: Primary | ICD-10-CM

## 2024-08-14 NOTE — PROGRESS NOTES
Physical Therapy Daily Treatment Note      Patient: Wanda Doe   : 1985  Diagnosis/ICD-10 Code:  Plantar fasciitis, left [M72.2]  Referring practitioner: DURAN Sierra DPM  Date of Initial Visit: Type: THERAPY  Noted: 2024  Today's Date: 2024  Patient seen for 6 sessions         Wanda Doe reports: the taping has been helping and she woke up today with less calf ain and she was very happy about that. Pt. States activity also seemed to irritate it less this AM with her outdoor walk. Pt. States she is noticing though that she is walking more on the outside edges of her feet. Upon review of shoes and inserts with Pt. It appears that the insert provides an arch on top of and already existing arch built into the shoe the had added them to possibly causing and excess arch and over support forcing patient into a supinated foot position placing bertram stress on lateral aspects of B feet.     Objective   See Exercise, Manual, and Modality Logs for complete treatment.     Assessment/Plan  Pt. Tolerates treatment well especially the manual techniques for tissue release in calf and plantar fascia. Pt. Had tape reapplied and upon standing reported an immediate change in how her heel felt with the tape. Pt. was encouraged after assessing shoes that it may be more beneficial to add her arch supports to more neutral shoes for more appropriate correction and avoid them in shoes with an already built up arch to avoid over correction. Current plan is improving pain and mobility and with minor changes here and there will provide significant improvement in Pt.s pain.     Goals  Plan Goals: Patient independent with HEP in 2 weeks  Tolerate 10 min Nustep in 2 weeks  Able to return to walking without minimal pain in 2 weeks  Able to ambulate independently without AD and minimal antalgia in 2 weeks  Improve function as evidenced by FAAM score of 20% or less in 12 weeks (54% impairment initially)  Able to return  to jogging, [employer] normal exercise routine in 12 weeks  Perform 10 reps of repeated sit to stand without arms in 30 seconds in 12 weeks    Progress strengthening /stabilization /functional activity           Timed:         Manual Therapy:    30     mins  01990;     Therapeutic Exercise:    10     mins  40832;     Neuromuscular Mayte:    10    mins  52849;     Ultrasound:     10     mins  01057;        Timed Treatment:   60   mins   Total Treatment:     60   mins    Pauly Triplett PTA  Physical Therapist Assistant License #[id number]

## 2024-08-16 ENCOUNTER — TREATMENT (OUTPATIENT)
Dept: PHYSICAL THERAPY | Facility: CLINIC | Age: 39
End: 2024-08-16
Payer: MEDICAID

## 2024-08-16 DIAGNOSIS — M72.2 PLANTAR FASCIITIS, LEFT: Primary | ICD-10-CM

## 2024-08-16 DIAGNOSIS — M79.672 LEFT FOOT PAIN: ICD-10-CM

## 2024-08-16 NOTE — PROGRESS NOTES
Physical Therapy Daily Treatment Note      Patient: Wanda Doe   : 1985  Diagnosis/ICD-10 Code:  Plantar fasciitis, left [M72.2]  Referring practitioner: DURAN Sierra DPM  Date of Initial Visit: Type: THERAPY  Noted: 2024  Today's Date: 2024  Patient seen for 7 sessions         Wanda Doe reports: she has been doing better when she is taped but states she has still had significant tightness in the calf despite persistent stretching and exercise. Pt. States she had her  rub her foot last night because the plantar fascia was flared up.    Objective   See Exercise, Manual, and Modality Logs for complete treatment.     Assessment/Plan  Pt. Tolerates treatment well this date and greatly benefited from addition of theragun for massage to toe lateral aspect of calf as tightness and trigger point were noted with palpation. Pt. Seems to have good response this date walking much more even on B LE's as she left today's session.    Goals  Plan Goals: Patient independent with HEP in 2 weeks  Tolerate 10 min Nustep in 2 weeks  Able to return to walking without minimal pain in 2 weeks  Able to ambulate independently without AD and minimal antalgia in 2 weeks  Improve function as evidenced by FAAM score of 20% or less in 12 weeks (54% impairment initially)  Able to return to jogging, [id number] normal exercise routine in 12 weeks  Perform 10 reps of repeated sit to stand without arms in 30 seconds in 12 weeks    Progress strengthening /stabilization /functional activity           Timed:         Manual Therapy:    15     mins  05175;     Therapeutic Exercise:    15     mins  76537;     Ultrasound:     10     mins  90492;        Timed Treatment:   40   mins   Total Treatment:     40   mins    Pauly Triplett PTA  Physical Therapist Assistant License #[id number]

## 2024-08-20 ENCOUNTER — TREATMENT (OUTPATIENT)
Dept: PHYSICAL THERAPY | Facility: CLINIC | Age: 39
End: 2024-08-20
Payer: MEDICAID

## 2024-08-20 DIAGNOSIS — M79.672 LEFT FOOT PAIN: ICD-10-CM

## 2024-08-20 DIAGNOSIS — M72.2 PLANTAR FASCIITIS, LEFT: Primary | ICD-10-CM

## 2024-08-23 ENCOUNTER — TREATMENT (OUTPATIENT)
Dept: PHYSICAL THERAPY | Facility: CLINIC | Age: 39
End: 2024-08-23
Payer: MEDICAID

## 2024-08-23 DIAGNOSIS — M72.2 PLANTAR FASCIITIS, LEFT: Primary | ICD-10-CM

## 2024-08-23 DIAGNOSIS — M79.672 LEFT FOOT PAIN: ICD-10-CM

## 2024-08-23 NOTE — PROGRESS NOTES
Physical Therapy Daily Treatment Note      Patient: Wanda Doe   : 1985  Diagnosis/ICD-10 Code:  Plantar fasciitis, left [M72.2]  Referring practitioner: DURAN Sierra DPM  Date of Initial Visit: Type: THERAPY  Noted: 2024  Today's Date: 2024  Patient seen for 9 sessions         Wanda Doe reports: last visit did her a lot of good reporting pain relief for 48 hrs following the last session. Pt. States she has continued to maintain her fitness challenge and is noticing flares ups still but to less of a degree.    Objective   See Exercise, Manual, and Modality Logs for complete treatment.     Assessment/Plan  Pt. Tolerates treatment well this date and ultrasound provided some relief to heel and tape was re-applied for support. Pt. Will continue to benefit for increased ankle strength and stability alongside tension relief techniques.    Goals  Plan Goals: Patient independent with HEP in 2 weeks  Tolerate 10 min Nustep in 2 weeks  Able to return to walking without minimal pain in 2 weeks  Able to ambulate independently without AD and minimal antalgia in 2 weeks  Improve function as evidenced by FAAM score of 20% or less in 12 weeks (54% impairment initially)  Able to return to [employer] normal exercise routine in 12 weeks  Perform 10 reps of repeated sit to stand without arms in 30 seconds in 12 weeks    Progress strengthening /stabilization /functional activity           Timed:         Manual Therapy:    10     mins  13942;     Therapeutic Exercise:    10     mins  45547;       Ultrasound:     10     mins  05568;        Timed Treatment:   30   mins   Total Treatment:     30   mins    Pauly Triplett PTA  Physical Therapist Assistant License #[id number]

## 2024-08-28 ENCOUNTER — TREATMENT (OUTPATIENT)
Dept: PHYSICAL THERAPY | Facility: CLINIC | Age: 39
End: 2024-08-28
Payer: MEDICAID

## 2024-08-28 ENCOUNTER — OFFICE VISIT (OUTPATIENT)
Dept: ORTHOPEDIC SURGERY | Facility: CLINIC | Age: 39
End: 2024-08-28
Payer: MEDICAID

## 2024-08-28 VITALS — HEART RATE: 77 BPM | WEIGHT: 159 LBS | OXYGEN SATURATION: 99 % | HEIGHT: 67 IN | BODY MASS INDEX: 24.96 KG/M2

## 2024-08-28 DIAGNOSIS — M72.2 PLANTAR FASCIITIS, LEFT: Primary | ICD-10-CM

## 2024-08-28 DIAGNOSIS — M17.12 PRIMARY OSTEOARTHRITIS OF LEFT KNEE: ICD-10-CM

## 2024-08-28 DIAGNOSIS — M25.562 LEFT KNEE PAIN, UNSPECIFIED CHRONICITY: Primary | ICD-10-CM

## 2024-08-28 DIAGNOSIS — M79.672 LEFT FOOT PAIN: ICD-10-CM

## 2024-08-28 NOTE — PROGRESS NOTES
Physical Therapy Daily Treatment Note      Patient: Wanda Doe   : 1985  Diagnosis/ICD-10 Code:  Plantar fasciitis, left [M72.2]   Problems Addressed this Visit    None  Visit Diagnoses       Plantar fasciitis, left    -  Primary    Left foot pain              Diagnoses         Codes Comments    Plantar fasciitis, left    -  Primary ICD-10-CM: M72.2  ICD-9-CM: 728.71     Left foot pain     ICD-10-CM: M79.672  ICD-9-CM: 729.5           Referring practitioner: DURAN Sierra DPM  Date of Initial Visit: Type: THERAPY  Noted: 2024  Today's Date: 2024    VISIT#: 10    Subjective : Pt reports her L foot is getting better and she can definitely see improvement. Has already walked this morning. Was having excruciating pain when walking and now is a dull ache. Sees MD the week after next.    Objective : added B heel raises.    See Exercise, Manual, and Modality Logs for complete treatment.     Assessment/Plan : Decreased L foot pain. Pt responding well to manual techniques and kinesiotaping. L gastroc weakness noted with B heel raises and pt was instructed to perform B and R to only match what L can do. Pt with good understanding. Needs continued plantar fascia and gastroc stretching in addition to ankle and gastroc strengthening.     Goals  Plan Goals: Patient independent with HEP in 2 weeks  Tolerate 10 min Nustep in 2 weeks  Able to return to walking without minimal pain in 2 weeks  Able to ambulate independently without AD and minimal antalgia in 2 weeks  Improve function as evidenced by FAAM score of 20% or less in 12 weeks (54% impairment initially)  Able to return to [employer] normal exercise routine in 12 weeks  Perform 10 reps of repeated sit to stand without arms in 30 seconds in 12 weeks    Progress per Plan of Care and Progress strengthening /stabilization /functional activity      Timed:         Manual Therapy:    15     mins  29315;     Therapeutic Exercise:    15     mins  61399;      Neuromuscular Mayte:        mins  30219;    Therapeutic Activity:          mins  81928;     Gait Training:           mins  86968;     Ultrasound:     10     mins  71173;    Ionto                                   mins   74055  Self Care                            mins   99855    Un-Timed:  Electrical Stimulation:         mins  84824 ( );  Dry Needling          mins 24657/26077  Traction          mins 14428  Canalith Repos                   mins  08703  Low Eval          Mins  16891  Mod Eval          Mins  52003  High Eval                            Mins  39218  Re-Eval                               mins  75699    Timed Treatment:   40   mins   Total Treatment:     40   mins    Lena Cartagena PT, CLT, Cert DN  Physical Therapist  [place] Lic # [id number]

## 2024-08-29 RX ORDER — TRIAMCINOLONE ACETONIDE 40 MG/ML
80 INJECTION, SUSPENSION INTRA-ARTICULAR; INTRAMUSCULAR
Status: COMPLETED | OUTPATIENT
Start: 2024-08-29 | End: 2024-08-29

## 2024-08-29 RX ADMIN — TRIAMCINOLONE ACETONIDE 80 MG: 40 INJECTION, SUSPENSION INTRA-ARTICULAR; INTRAMUSCULAR at 17:59

## 2024-08-29 NOTE — PROGRESS NOTES
Primary Care Sports Medicine Office Visit Note    Patient ID: Wanda Doe is a 39 y.o. female.    Chief Complaint:  Chief Complaint   Patient presents with   • Left Knee - Follow-up, Pain       History of Present Illness  The patient presents for evaluation of left knee pain.    She reports occasional swelling in her left knee, which feels unstable and tighter than her right knee. She notes that her left leg feels weaker, particularly when walking on uneven surfaces, and experiences significant fatigue when using her left leg on gravel. She describes a sensation of instability in her entire left leg but cannot pinpoint whether the source is her knee or foot due to the severity of her foot pain. She occasionally hears a popping sound from her knee, which seems to temporarily alleviate the discomfort. She recalls a previous incident where her knee popped out of place. An injection in her knee in March 2020 provided some relief.    She has been under the care of Dr. Sierra for her foot condition, who is currently investigating the cause of her severe foot symptoms. She experiences discomfort in the bottom and heel of her foot, as well as in the Achilles tendon. Treatment so far has included a cortisone injection in May 2024, followed by physical therapy starting in July 2024. Despite adhering to the prescribed regimen, including home stretching exercises, she reports only minimal improvement. She is scheduled to see Dr. Sierra again on 09/09/2024. Her physical therapist suspects a heel spur and has recommended that she discuss this with Dr. Sierra.       Past Medical History:   Diagnosis Date   • Bipolar 2 disorder    • Chronic constipation    • CTS (carpal tunnel syndrome) 2 years   • Fracture of wrist 5 years   • Generalized anxiety disorder    • Genital herpes    • Genital warts    • Gestational diabetes    • HPV in female    • Hyperbilirubinemia    • LGSIL on Pap smear of cervix    • Migraine headache    •  "Neuroma of foot    • Tear of meniscus of knee 4 years plus    Unknown   • Tendinitis of knee     Unknown   • Thyromegaly 2020       Past Surgical History:   Procedure Laterality Date   • COLPOSCOPY     • DILATATION AND CURETTAGE         Family History   Problem Relation Age of Onset   • Cancer Other    • Diabetes Other      Social History     Occupational History   • Not on file   Tobacco Use   • Smoking status: Former     Current packs/day: 0.00     Types: Cigarettes     Quit date:      Years since quittin.6   • Smokeless tobacco: Never   Vaping Use   • Vaping status: Never Used   Substance and Sexual Activity   • Alcohol use: No   • Drug use: No   • Sexual activity: Yes     Partners: Male     Birth control/protection: Hysterectomy      Review of Systems  Objective:  Review of Systems:  Review of Systems   Constitutional:  Negative for activity change, fatigue and fever.   Musculoskeletal:  Positive for arthralgias.   Skin:  Negative for color change and rash.   Neurological:  Negative for numbness.     Physical Exam  Pulse 77   Ht 170.2 cm (67\")   Wt 72.1 kg (159 lb)   SpO2 99%   BMI 24.90 kg/m²   Vitals and nursing note reviewed.   Constitutional:       General: she is not in acute distress.     Appearance: she is well-developed. He is not diaphoretic.   HENT:      Head: Normocephalic and atraumatic.   Eyes:      Conjunctiva/sclera: Conjunctivae normal.   Pulmonary:      Effort: Pulmonary effort is normal. No respiratory distress.   Skin:     General: Skin is warm.      Capillary Refill: Capillary refill takes less than 2 seconds.   Neurological:      Mental Status: she  is alert.     Left Ankle Exam     Range of Motion   The patient has normal left ankle ROM.       Left Knee Exam     Muscle Strength   The patient has normal left knee strength.    Tenderness   The patient is experiencing tenderness in the lateral joint line, medial joint line and patella.    Range of Motion   Extension:  normal " "  Flexion:  normal     Tests   Michelle:  Medial - negative Lateral - negative  Varus: negative Valgus: negative  Left knee patellar apprehension test: +patellar grind testing, +sho orellana testing.    Other   Erythema: absent  Sensation: normal  Pulse: present (distal to the knee, DP and PT palpable)  Swelling: none  Effusion: no effusion present          Results  Imaging  Three-view x-ray of the left knee today shows tricompartmental joint space narrowing consistent with mild to moderate osteoarthritic disease.  No acute findings.    Assessment and Plan:    1. Left knee pain, unspecified chronicity (Primary)  -     XR Knee 3 View Left    After discussion of risks and benefits, the patient elected to proceed with corticosteroid injection to the left knee.  The patient tolerated this procedure well without any complaints or problems.  I recommended continuation of conservative management as previous, RTC in 3-6 months or sooner if symptoms recur.    Large Joint Arthrocentesis: L knee  Date/Time: 8/29/2024 5:59 PM  Consent given by: patient  Site marked: site marked  Timeout: Immediately prior to procedure a time out was called to verify the correct patient, procedure, equipment, support staff and site/side marked as required   Supporting Documentation  Indications: pain   Procedure Details  Location: knee - L knee  Preparation: Patient was prepped and draped in the usual sterile fashion  Needle size: 25 G  Approach: anteromedial  Medications administered: 80 mg triamcinolone acetonide 40 MG/ML (2cc of 1% lidocaine without epinepherine)  Patient tolerance: patient tolerated the procedure well with no immediate complications (Blood loss negligable, pt admits to immediate decrease in pain and improved ROM with gentle ambulation post injection.)      Barrington DAVIS \"Chance\" Josue ASH DO, CAQSM  08/29/24  17:58 EDT    Disclaimer: Please note that areas of this note were completed with computer voice recognition software.  " Quite often unanticipated grammatical, syntax, homophones, and other interpretive errors are inadvertently transcribed by the computer software. Please excuse any errors that have escaped final proofreading.    Patient or patient representative verbalized consent for the use of Ambient Listening during the visit with  Barrington Salas II, DO for chart documentation. 08/29/24  17:58 EDT

## 2024-08-30 ENCOUNTER — PATIENT ROUNDING (BHMG ONLY) (OUTPATIENT)
Dept: ORTHOPEDIC SURGERY | Facility: CLINIC | Age: 39
End: 2024-08-30
Payer: MEDICAID

## 2024-08-30 ENCOUNTER — TREATMENT (OUTPATIENT)
Dept: PHYSICAL THERAPY | Facility: CLINIC | Age: 39
End: 2024-08-30
Payer: MEDICAID

## 2024-08-30 DIAGNOSIS — M79.672 LEFT FOOT PAIN: ICD-10-CM

## 2024-08-30 DIAGNOSIS — M72.2 PLANTAR FASCIITIS, LEFT: Primary | ICD-10-CM

## 2024-08-30 NOTE — PROGRESS NOTES
Physical Therapy Progress Note/Reassessment  [address] [place] [address] , [place] [address]    Patient: Wanda Doe   : 1985  Diagnosis/ICD-10 Code:  Plantar fasciitis, left [M72.2]  Referring practitioner: DURAN Sierra DPM  Date of Initial Evaluation:  Type: THERAPY  Noted: 2024  Patient seen for 11 sessions      Subjective:   Visit Diagnoses:    ICD-10-CM ICD-9-CM   1. Plantar fasciitis, left  M72.2 728.71   2. Left foot pain  M79.672 729.5         Wanda Doe reports her L foot is feeling better since starting PT. Still has some soreness in arch of foot and at heel. States he saw ortho, Dr. Salas, 2 days ago for L knee pain. She had injection in L knee and states it does feel better now. Started having R hip/groin pain yesterday. Denies any accident or incident that would cause this new hip pain.  Subjective Questionnaire: not completed  Clinical Progress: improved  Home Program Compliance: Yes  Treatment has included: therapeutic exercise, neuromuscular re-education, manual therapy, therapeutic activity, gait training, and ultrasound      Objective          Tenderness   Left Ankle/Foot   Tenderness in the Achilles insertion and plantar fascia.     Strength/Myotome Testing     Left Hip   Planes of Motion   Flexion: 4  Extension: 4-  Abduction: 4+    Right Hip   Planes of Motion   Flexion: 4-  Extension: 4-  Abduction: 4    Left Knee   Flexion: 4+  Extension: 4+    Left Ankle/Foot   Dorsiflexion: 4+  Plantar flexion: 3+  Inversion: 4+  Eversion: 4+        See Exercise, Manual, and Modality Logs for complete treatment.     Assessment/Plan : Pt has made gains with L foot and heel pain improving. Pt continues to have pain with prolonged standing and walking. Pt also with recurrent L knee pain and hip weakness. Plan to incorporate hip and knee strengthening into current POC for optimal results.     Goals  Plan Goals: Patient independent with HEP in 2 weeks - MET  Tolerate 10 min Nustep in 2 weeks -  Progressing   Able to return to walking without minimal pain in 2 weeks - PRogressing  Able to ambulate independently without AD and minimal antalgia in 2 weeks - MET  Improve function as evidenced by FAAM score of 20% or less in 12 weeks (54% impairment initially) - Not met  Able to return to jogging, [employer] normal exercise routine in 12 weeks - Not met     Recommendations: Continue as planned  Timeframe: 2 months  Prognosis to achieve goals: good      Timed:         Manual Therapy:    15     mins  09983;     Therapeutic Exercise:    10     mins  45229;     Neuromuscular Mayte:    10    mins  09185;    Therapeutic Activity:          mins  81961;     Gait Training:           mins  87688;     Ultrasound:     10    mins  11782;    Ionto                                   mins   83057  Self Care                            mins   79104      Un-Timed:  Electrical Stimulation:         mins  41466 ( );  Dry Needling          mins self-pay  Traction          mins 15586  Canalith Repos         mins 14248      Timed Treatment:   45   mins   Total Treatment:     45   mins    PT Signature: Lena Cartagena PT, CLT  PT License: [place] Lic # [id number]

## 2024-09-03 ENCOUNTER — TREATMENT (OUTPATIENT)
Dept: PHYSICAL THERAPY | Facility: CLINIC | Age: 39
End: 2024-09-03
Payer: MEDICAID

## 2024-09-03 DIAGNOSIS — M79.672 LEFT FOOT PAIN: ICD-10-CM

## 2024-09-03 DIAGNOSIS — M72.2 PLANTAR FASCIITIS, LEFT: Primary | ICD-10-CM

## 2024-09-03 PROCEDURE — 97140 MANUAL THERAPY 1/> REGIONS: CPT | Performed by: PHYSICAL THERAPIST

## 2024-09-03 PROCEDURE — 97112 NEUROMUSCULAR REEDUCATION: CPT | Performed by: PHYSICAL THERAPIST

## 2024-09-03 PROCEDURE — 97110 THERAPEUTIC EXERCISES: CPT | Performed by: PHYSICAL THERAPIST

## 2024-09-03 NOTE — PROGRESS NOTES
Physical Therapy Daily Treatment Note      Patient: Wanda Doe   : 1985  Diagnosis/ICD-10 Code:  Plantar fasciitis, left [M72.2]   Problems Addressed this Visit    None  Visit Diagnoses       Plantar fasciitis, left    -  Primary    Left foot pain              Diagnoses         Codes Comments    Plantar fasciitis, left    -  Primary ICD-10-CM: M72.2  ICD-9-CM: 728.71     Left foot pain     ICD-10-CM: M79.672  ICD-9-CM: 729.5           Referring practitioner: DURAN Sierra DPM  Date of Initial Visit: Type: THERAPY  Noted: 2024  Today's Date: 9/3/2024    VISIT#: 12    Subjective : Pt reports her L foot is feeling better. Was able to walk yesterday with no pain. Still has pain when working and standing in 1 place despite using foam mat and wearing supportive shoes. Did well without kinesiotape last visit.    Objective : Held kinesiotaping.  Added leg press to ther ex.   See Exercise, Manual, and Modality Logs for complete treatment.     Assessment/Plan : Decreased pain at start of session and able to walk for exercise over the weekend without pain. 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 Needs continued L ankle and B hip and knee strengthening for improved tolerance to all functional tasks.    Goals  Plan Goals: Patient independent with HEP in 2 weeks - MET  Tolerate 10 min Nustep in 2 weeks - Progressing   Able to return to walking without minimal pain in 2 weeks - PRogressing  Able to ambulate independently without AD and minimal antalgia in 2 weeks - MET  Improve function as evidenced by FAAM score of 20% or less in 12 weeks (54% impairment initially) - Not met  Able to return to jogging, [employer] normal exercise routine in 12 weeks - Not met    Progress per Plan of Care and Progress strengthening /stabilization /functional activity         Timed:         Manual Therapy:    15     mins  07636;     Therapeutic Exercise:    10     mins  95108;     Neuromuscular Mayte:    10    mins   21407;    Therapeutic Activity:          mins  07234;     Gait Training:           mins  79479;     Ultrasound:      10    mins  88226;    Ionto                                   mins   09548  Self Care                            mins   10510    Un-Timed:  Electrical Stimulation:         mins  86763 ( );  Dry Needling          mins 47088/17021  Traction          mins 25756  Canalith Repos                   mins  73995  Low Eval          Mins  28602  Mod Eval          Mins  42289  High Eval                            Mins  82539  Re-Eval                               mins  77180    Timed Treatment:   45   mins   Total Treatment:     45   mins    Lena Cartagena PT, CLT, Cert DN  Physical Therapist  [place] Lic # [id number]

## 2024-09-05 ENCOUNTER — TREATMENT (OUTPATIENT)
Dept: PHYSICAL THERAPY | Facility: CLINIC | Age: 39
End: 2024-09-05
Payer: MEDICAID

## 2024-09-05 DIAGNOSIS — M79.672 LEFT FOOT PAIN: ICD-10-CM

## 2024-09-05 DIAGNOSIS — M72.2 PLANTAR FASCIITIS, LEFT: Primary | ICD-10-CM

## 2024-09-05 NOTE — PROGRESS NOTES
Physical Therapy Daily Treatment Note      Patient: Wanda Doe   : 1985  Diagnosis/ICD-10 Code:  Plantar fasciitis, left [M72.2]  Referring practitioner: DURAN Sierra DPM  Date of Initial Visit: Type: THERAPY  Noted: 2024  Today's Date: 2024  Patient seen for 13 sessions         Wanda Deo reports: she continues to improve overall and believes the knee shot helped alleviate some of her compensation. Pt. States she did have her foot pain flare up some due to work yesterday but she feels the therapy session will help alleviate that. Pt. States if she can find a way to improve her work shift pain everything else has gotten quite a bit better.    Objective   See Exercise, Manual, and Modality Logs for complete treatment.     Assessment/Plan  Pt. Tolerates treatment well this date manual techniques did not reveal as much tension as previous visits and response was much more immediate to stretch. Pt. Has continued good response to taping techniques for ankle stability and heel cord support.    Goals  Plan Goals: Patient independent with HEP in 2 weeks - MET  Tolerate 10 min Nustep in 2 weeks - Progressing   Able to return to walking without minimal pain in 2 weeks - PRogressing  Able to ambulate independently without AD and minimal antalgia in 2 weeks - MET  Improve function as evidenced by FAAM score of 20% or less in 12 weeks (54% impairment initially) - Not met  Able to return to jogging, [employer] normal exercise routine in 12 weeks - Not met    Progress strengthening /stabilization /functional activity           Timed:         Manual Therapy:    15     mins  19817;     Therapeutic Exercise:    15     mins  13773;     Ultrasound:     8     mins  71308;        Timed Treatment:   38   mins   Total Treatment:     38   mins    Pauly Triplett PTA  Physical Therapist Assistant License #[id number]

## 2024-09-09 ENCOUNTER — OFFICE VISIT (OUTPATIENT)
Dept: PODIATRY | Facility: CLINIC | Age: 39
End: 2024-09-09
Payer: MEDICAID

## 2024-09-09 ENCOUNTER — TELEPHONE (OUTPATIENT)
Dept: ORTHOPEDICS | Facility: OTHER | Age: 39
End: 2024-09-09
Payer: MEDICAID

## 2024-09-09 VITALS — RESPIRATION RATE: 20 BRPM | WEIGHT: 159 LBS | BODY MASS INDEX: 24.96 KG/M2 | HEIGHT: 67 IN

## 2024-09-09 DIAGNOSIS — M79.672 LEFT FOOT PAIN: Primary | ICD-10-CM

## 2024-09-09 DIAGNOSIS — M72.2 PLANTAR FASCIITIS, LEFT: ICD-10-CM

## 2024-09-09 DIAGNOSIS — M21.862 ACQUIRED POSTERIOR EQUINUS, LEFT: ICD-10-CM

## 2024-09-09 RX ORDER — PHENTERMINE HYDROCHLORIDE 37.5 MG/1
37.5 TABLET ORAL
COMMUNITY
Start: 2024-08-01

## 2024-09-09 RX ORDER — VALACYCLOVIR HYDROCHLORIDE 1 G/1
TABLET, FILM COATED ORAL
COMMUNITY
Start: 2024-09-07

## 2024-09-09 RX ORDER — TRIAMCINOLONE ACETONIDE 1 MG/G
OINTMENT TOPICAL
COMMUNITY
Start: 2024-08-01

## 2024-09-10 ENCOUNTER — TREATMENT (OUTPATIENT)
Dept: PHYSICAL THERAPY | Facility: CLINIC | Age: 39
End: 2024-09-10
Payer: MEDICAID

## 2024-09-10 DIAGNOSIS — M79.672 LEFT FOOT PAIN: ICD-10-CM

## 2024-09-10 DIAGNOSIS — M72.2 PLANTAR FASCIITIS, LEFT: Primary | ICD-10-CM

## 2024-09-10 PROCEDURE — 97140 MANUAL THERAPY 1/> REGIONS: CPT | Performed by: PHYSICAL THERAPIST

## 2024-09-10 PROCEDURE — 97110 THERAPEUTIC EXERCISES: CPT | Performed by: PHYSICAL THERAPIST

## 2024-09-10 NOTE — PROGRESS NOTES
09/09/2024  Foot and Ankle Surgery - Established Patient/Follow-up  Provider: Dr. Be Sierra DPM  Location: [facility]    Subjective:  Wanda Doe is a 39 y.o. female.     Chief Complaint   Patient presents with    Left Foot - Follow-up, Pain    Follow-up     LYNETTE Tran aprn 8/1/2024       History of Present Illness  The patient presents for evaluation of her knee pain.    She reports an improvement in her condition, attributing it to the physical therapy she has been undergoing. She estimates her recovery at 80 percent, noting a significant reduction in morning stiffness. Her daily routine includes extensive stretching exercises, lasting between half an hour to an hour. She ensures to stretch before and after her workouts, as well as in the evenings.     She is now able to walk and jog without difficulty, although she does not engage in running. Her physical therapy sessions have focused on strengthening her lower extremities, particularly on uneven surfaces. She has also received injections in her knee and foot.    An x-ray revealed that her knee condition is similar to what it was four years ago, indicating that it is not in optimal condition. Her hips were also examined during physical therapy, revealing a weak right hip. It was suggested that she might be overcompensating by putting more weight on her left side, which could potentially affect her foot.     She plans to continue with her physical therapy sessions.      No Known Allergies    Current Outpatient Medications on File Prior to Visit   Medication Sig Dispense Refill    clonazePAM (KlonoPIN) 0.5 MG tablet Take 1 tablet by mouth Daily.      desvenlafaxine (PRISTIQ) 100 MG 24 hr tablet TAKE 1 TABLET BY MOUTH DAILY 90 tablet 1    fluticasone (FLONASE) 50 MCG/ACT nasal spray 2 sprays by Each Nare route Daily. Shake well before using.      hydrocortisone 2.5 % ointment APPLY TOPICALLY TO EYE LIDS TWICE DAILY      phentermine (ADIPEX-P)  "37.5 MG tablet Take 1 tablet by mouth.      triamcinolone (KENALOG) 0.1 % ointment Apply  topically to the appropriate area as directed.      valACYclovir (VALTREX) 1000 MG tablet TAKE 1 TABLET BY MOUTH THREE TIMES DAILY DRINK PLENTY OF FLUIDS       No current facility-administered medications on file prior to visit.       Objective   Resp 20   Ht 170.2 cm (67\")   Wt 72.1 kg (159 lb)   BMI 24.90 kg/m²     Foot/Ankle Exam  Physical Exam  General:   Appearance: appears stated age and healthy    Orientation: AAOx3    Affect: appropriate       VASCULAR       Right Foot Vascularity   Normal vascular exam    Dorsalis pedis:  2+  Posterior tibial:  2+  Skin Temperature: warm    Edema Grading:  None  CFT:  < 3 seconds  Pedal Hair Growth:  Present  Varicosities: none        Left Foot Vascularity   Normal vascular exam    Dorsalis pedis:  2+  Posterior tibial:  2+  Skin Temperature: warm    Edema Grading:  None  CFT:  < 3 seconds  Pedal Hair Growth:  Present  Varicosities: none        MUSCULOSKELETAL       Right Foot Musculoskeletal   Ecchymosis:  None  Tenderness: none    Arch:  Pes planus      Left Foot Musculoskeletal   Ecchymosis:  None  Tenderness: none    Arch:  Pes planus      MUSCLE STRENGTH      Right Foot Muscle Strength   Normal strength    Foot dorsiflexion:  5  Foot plantar flexion:  5  Foot inversion:  5  Foot eversion:  5      DERMATOLOGIC      Right Foot Dermatologic   Skin: skin intact    Nails: normal        Left Foot Dermatologic   Skin: skin intact    Nails: normal        Right Foot Additional Comments Pain with palpation to plantar heel      Left Foot Additional Comments: Pain with palpation to plantar heel     01/09/2022: Pain and palpation to plantar heel. Inflammation noted.     02/23/2023: Continued discomfort with palpation involving the plantar medial calcaneal tuberosity of the left heel. No gross deformity, instability. Mild equinus contracture with knee extended and flexed.     6/19/24: Pain " with palpation to the plantar aspect of the left heel.  Continued equinus contracture with knee extended and flexed.  No progressive deformity or instability.    9/9/24: Less discomfort with palpation involving the plantar aspect of the left heel.  Less soft tissue rigidity noted with testing.  No further concerns or complaints.      Results      Assessment & Plan   Diagnoses and all orders for this visit:    1. Left foot pain (Primary)    2. Plantar fasciitis, left    3. Acquired posterior equinus, left      Assessment & Plan    Patient has noticed significant improvement since previous evaluation.  She has been performing physical therapy and low impact exercises.  She has obtained evaluation regarding her left knee.  Overall, she feels that she is doing 80% better.  I have asked that she continue to wear appropriate shoes and inserts on a daily basis.  I would like her to continue stretching and low impact exercises.  We reviewed the importance of RICE therapy and proper use of OTC anti-inflammatories along with avoidance of high-impact and excessive activity.  Patient is to call with any additional issues or concerns and I will see her on an as-needed basis.  Greater than 20 minutes spent before, during, and after evaluation for patient care.               Patient or patient representative verbalized consent for the use of Ambient Listening during the visit with  DURAN Sierra DPM for chart documentation. 9/10/2024  06:55 EDT    DURAN Sierra DPM

## 2024-09-10 NOTE — PROGRESS NOTES
Physical Therapy Daily Treatment Note      Patient: Wanda Doe   : 1985  Diagnosis/ICD-10 Code:  Plantar fasciitis, left [M72.2]  Referring practitioner: DURAN Sierra DPM  Date of Initial Visit: Type: THERAPY  Noted: 2024  Today's Date: 9/10/2024  Patient seen for 14 sessions         Wanda Doe reports: the therapy is helping her progress well and she continues to utilize tape for heel pain and she is doing her exercises daily to increase B LE strength and equal use.     Objective   See Exercise, Manual, and Modality Logs for complete treatment.     Assessment/Plan  Pt. Tolerates treatment very well and the presence of tension in L calf is decreasing. Pt. Now had a theragun at home and utilizes it daily as well and it is helping.    Goals  Plan Goals: Patient independent with HEP in 2 weeks - MET  Tolerate 10 min Nustep in 2 weeks - Progressing   Able to return to walking without minimal pain in 2 weeks - PRogressing  Able to ambulate independently without AD and minimal antalgia in 2 weeks - MET  Improve function as evidenced by FAAM score of 20% or less in 12 weeks (54% impairment initially) - Not met  Able to return to [employer], [employer] normal exercise routine in 12 weeks - Not met    Progress strengthening /stabilization /functional activity           Timed:         Manual Therapy:    15     mins  49449;     Therapeutic Exercise:    10     mins  24648;     Ultrasound:     8     mins  98762;        Timed Treatment:   33   mins   Total Treatment:     33   mins    Pauly Triplett PTA  Physical Therapist Assistant License #[id number]

## 2024-09-17 ENCOUNTER — TREATMENT (OUTPATIENT)
Dept: PHYSICAL THERAPY | Facility: CLINIC | Age: 39
End: 2024-09-17
Payer: MEDICAID

## 2024-09-17 DIAGNOSIS — M79.672 LEFT FOOT PAIN: ICD-10-CM

## 2024-09-17 DIAGNOSIS — M72.2 PLANTAR FASCIITIS, LEFT: Primary | ICD-10-CM

## 2024-09-17 PROCEDURE — 97140 MANUAL THERAPY 1/> REGIONS: CPT | Performed by: PHYSICAL THERAPIST

## 2024-09-17 PROCEDURE — 97110 THERAPEUTIC EXERCISES: CPT | Performed by: PHYSICAL THERAPIST

## 2024-09-19 ENCOUNTER — TREATMENT (OUTPATIENT)
Dept: PHYSICAL THERAPY | Facility: CLINIC | Age: 39
End: 2024-09-19
Payer: MEDICAID

## 2024-09-19 DIAGNOSIS — M79.672 LEFT FOOT PAIN: ICD-10-CM

## 2024-09-19 DIAGNOSIS — M72.2 PLANTAR FASCIITIS, LEFT: Primary | ICD-10-CM

## 2024-09-25 ENCOUNTER — TREATMENT (OUTPATIENT)
Dept: PHYSICAL THERAPY | Facility: CLINIC | Age: 39
End: 2024-09-25
Payer: MEDICAID

## 2024-09-25 DIAGNOSIS — M72.2 PLANTAR FASCIITIS, LEFT: Primary | ICD-10-CM

## 2024-09-25 DIAGNOSIS — M79.672 LEFT FOOT PAIN: ICD-10-CM

## 2024-09-30 ENCOUNTER — TREATMENT (OUTPATIENT)
Dept: PHYSICAL THERAPY | Facility: CLINIC | Age: 39
End: 2024-09-30
Payer: MEDICAID

## 2024-09-30 DIAGNOSIS — M79.672 LEFT FOOT PAIN: ICD-10-CM

## 2024-09-30 DIAGNOSIS — M72.2 PLANTAR FASCIITIS, LEFT: Primary | ICD-10-CM

## 2024-09-30 PROCEDURE — 97110 THERAPEUTIC EXERCISES: CPT

## 2024-09-30 PROCEDURE — 97112 NEUROMUSCULAR REEDUCATION: CPT

## 2024-10-02 ENCOUNTER — TREATMENT (OUTPATIENT)
Dept: PHYSICAL THERAPY | Facility: CLINIC | Age: 39
End: 2024-10-02
Payer: MEDICAID

## 2024-10-02 DIAGNOSIS — M79.672 LEFT FOOT PAIN: ICD-10-CM

## 2024-10-02 DIAGNOSIS — M72.2 PLANTAR FASCIITIS, LEFT: Primary | ICD-10-CM

## 2024-10-02 PROCEDURE — 97112 NEUROMUSCULAR REEDUCATION: CPT

## 2024-10-02 PROCEDURE — 97110 THERAPEUTIC EXERCISES: CPT

## 2024-10-02 NOTE — PROGRESS NOTES
Physical Therapy Daily Treatment Note      Patient: Wanda Doe   : 1985  Diagnosis/ICD-10 Code:  Plantar fasciitis, left [M72.2]  Referring practitioner: DURAN Sierra DPM  Date of Initial Visit: Type: THERAPY  Noted: 2024  Today's Date: 10/2/2024  Patient seen for 19 sessions         Wanda Doe reports: today her foot isn't as bad and se has bene doing better overall she states now when she has pain its not lasting or chronic she states now she able to find relief through stretching and rest.    Objective   See Exercise, Manual, and Modality Logs for complete treatment.     Assessment/Plan  Pt. Tolerates treatment well this visit and continues to respond well to a global LE strengthening approach. Pt. Has some imbalance in strengths and is addressing them through daily HEP and therapy activities in clinic.    Goals  Plan Goals: Patient independent with HEP in 2 weeks - MET  Tolerate 10 min Nustep in 2 weeks - Progressing   Able to return to walking without minimal pain in 2 weeks - PRogressing  Able to ambulate independently without AD and minimal antalgia in 2 weeks - MET  Improve function as evidenced by FAAM score of 20% or less in 12 weeks (54% impairment initially) - Not met  Able to return to [employer] normal exercise routine in 12 weeks - Not met    Progress strengthening /stabilization /functional activity           Timed:         Manual Therapy:    10     mins  28085;     Therapeutic Exercise:    10     mins  98432;     Neuromuscular Mayte:    10    mins  02038;      Timed Treatment:   30   mins   Total Treatment:     30   mins    Pauly Triplett PTA  Physical Therapist Assistant License #[id number]

## 2024-10-10 NOTE — PROGRESS NOTES
Physical Therapy Daily Treatment Note      Patient: Wanda Doe   : 1985  Diagnosis/ICD-10 Code:  Plantar fasciitis, left [M72.2]  Referring practitioner: DURAN Sierra DPM  Date of Initial Visit: Type: THERAPY  Noted: 2024  Today's Date: 2024  Patient seen for 8 sessions         Wanda Doe reports: her heel has bene hurting the most since she had to walk around on and stand on varied surfaces at her sons dirt bike event over the weekend stating it was all uneven ground of some kind whether grass, rock, dirt, etc. Pt. States the calf hasn't hurt as bad since last visit but the heel has increased in pain.    Objective   See Exercise, Manual, and Modality Logs for complete treatment.     Assessment/Plan  Pt. responds well to deep massage this date utilizing edge tool as well as cupping and ultrasound for relief. Tape was reapplied at the end of session and has continued good response to taping.    Progress strengthening /stabilization /functional activity           Timed:         Manual Therapy:    8     mins  97258;     Therapeutic Exercise:    8     mins  17913;     Neuromuscular Mayte:    8   mins  78978;    Ultrasound:                     8     mins  21266;      Timed Treatment:   32   mins   Total Treatment:     32   mins    Pauly Triplett PTA  Physical Therapist Assistant License #[id number]      
No

## 2024-10-16 ENCOUNTER — TREATMENT (OUTPATIENT)
Dept: PHYSICAL THERAPY | Facility: CLINIC | Age: 39
End: 2024-10-16
Payer: MEDICAID

## 2024-10-16 DIAGNOSIS — M72.2 PLANTAR FASCIITIS, LEFT: Primary | ICD-10-CM

## 2024-10-16 DIAGNOSIS — M79.672 LEFT FOOT PAIN: ICD-10-CM

## 2024-10-16 NOTE — PROGRESS NOTES
Physical Therapy Daily Treatment Note      Patient: Wanda Doe   : 1985  Diagnosis/ICD-10 Code:  Plantar fasciitis, left [M72.2]  Referring practitioner: DURAN Sierra DPM  Date of Initial Visit: Type: THERAPY  Noted: 2024  Today's Date: 10/16/2024  Patient seen for 20 sessions         Wanda Doe reports: she was able to climb the immense amount of stairs on vacation (2,744) without foot ankle or heel pain. Pt. states her muscles are very sore in her legs and she has had some lingering fatigue but she was able to accomplish what she set out to do without pain. Pt. States she also completes her 75 days hard challenge tomorrow and is very excited at the results of exercise, dieting, and therapy that have improved her life over the last 3 months.    Objective   See Exercise, Manual, and Modality Logs for complete treatment.     Assessment/Plan  Pt. denied the need for ultrasound today and manual intervention for muscle soreness with thergaun was performed to [place] delcid. Pt. Completed exercises with good form and addition of strap stretches and PB stretches helped reduce soreness and stiffness.    Goals  Plan Goals: Patient independent with HEP in 2 weeks - MET  Tolerate 10 min Nustep in 2 weeks - Progressing   Able to return to walking without minimal pain in 2 weeks - PRogressing  Able to ambulate independently without AD and minimal antalgia in 2 weeks - MET  Improve function as evidenced by FAAM score of 20% or less in 12 weeks (54% impairment initially) - Not met  Able to return to [employer]ging, [employer] normal exercise routine in 12 weeks - Not met    Progress strengthening /stabilization /functional activity           Timed:         Manual Therapy:    15     mins  42397;     Therapeutic Exercise:    15     mins  95464;     Neuromuscular Mayte:    10    mins  82268;      Timed Treatment:   40   mins   Total Treatment:     40   mins    Pauly Triplett PTA  Physical Therapist Assistant License  #[id number]

## 2024-10-18 ENCOUNTER — TELEPHONE (OUTPATIENT)
Dept: ORTHOPEDICS | Facility: OTHER | Age: 39
End: 2024-10-18
Payer: MEDICAID

## 2024-10-23 ENCOUNTER — TREATMENT (OUTPATIENT)
Dept: PHYSICAL THERAPY | Facility: CLINIC | Age: 39
End: 2024-10-23
Payer: MEDICAID

## 2024-10-23 DIAGNOSIS — M79.672 LEFT FOOT PAIN: ICD-10-CM

## 2024-10-23 DIAGNOSIS — M72.2 PLANTAR FASCIITIS, LEFT: Primary | ICD-10-CM

## 2024-10-23 PROCEDURE — 97112 NEUROMUSCULAR REEDUCATION: CPT

## 2024-10-23 PROCEDURE — 97110 THERAPEUTIC EXERCISES: CPT

## 2024-10-23 PROCEDURE — 97140 MANUAL THERAPY 1/> REGIONS: CPT

## 2024-10-23 NOTE — PROGRESS NOTES
Physical Therapy Daily Treatment Note      Patient: Wanda Doe   : 1985  Diagnosis/ICD-10 Code:  Plantar fasciitis, left [M72.2]  Referring practitioner: DURAN Sierra DPM  Date of Initial Visit: Type: THERAPY  Noted: 2024  Today's Date: 10/23/2024  Patient seen for 21 sessions         Wanda Doe reports: after today she intends to reduce to once a week to continue and progress exercises to no lose what pain relief she has received thus far.    Objective   See Exercise, Manual, and Modality Logs for complete treatment.     Assessment/Plan  Pt. Will be assessed each visit going forward and base don progress will be given balance and strengthening exercises to continue progression. Pt. Will also benefit from continued trigger point management in B calves.    Goals  Plan Goals: Patient independent with HEP in 2 weeks - MET  Tolerate 10 min Nustep in 2 weeks - Progressing   Able to return to walking without minimal pain in 2 weeks - PRogressing  Able to ambulate independently without AD and minimal antalgia in 2 weeks - MET  Improve function as evidenced by FAAM score of 20% or less in 12 weeks (54% impairment initially) - Not met  Able to return to [employer] normal exercise routine in 12 weeks - Not met    Progress strengthening /stabilization /functional activity      Timed:         Manual Therapy:    10     mins  21590;     Therapeutic Exercise:    15     mins  61037;     Neuromuscular Mayte:    15    mins  69544;        Timed Treatment:   40   mins   Total Treatment:     40   mins    Pauly Triplett PTA  Physical Therapist Assistant License #[id number]

## 2024-10-30 ENCOUNTER — TREATMENT (OUTPATIENT)
Dept: PHYSICAL THERAPY | Facility: CLINIC | Age: 39
End: 2024-10-30
Payer: MEDICAID

## 2024-10-30 DIAGNOSIS — M79.672 LEFT FOOT PAIN: ICD-10-CM

## 2024-10-30 DIAGNOSIS — M72.2 PLANTAR FASCIITIS, LEFT: Primary | ICD-10-CM

## 2024-10-30 NOTE — PROGRESS NOTES
Physical Therapy Daily Treatment Note      Patient: Wanda Doe   : 1985  Diagnosis/ICD-10 Code:  Plantar fasciitis, left [M72.2]  Referring practitioner: DURAN Sierra DPM  Date of Initial Visit: Type: THERAPY  Noted: 2024  Today's Date: 10/30/2024  Patient seen for 22 sessions         Wanda Doe reports: she is having some weakness and numbness in her L foot this morning but states it different than her usual presentation where she has plantar fascia pain she states this is not painful just a weird sensation.    Objective   See Exercise, Manual, and Modality Logs for complete treatment.     Assessment/Plan  Pt. Completes exercises well and has good stability with balance despite NT symptoms. Pt. Was encouraged to do some NT stretching alongside HS stretch utilizing ankle pump. PTA performed LAD intermittently while stretching HS in side lying.    Goals  Plan Goals: Patient independent with HEP in 2 weeks - MET  Tolerate 10 min Nustep in 2 weeks - Progressing   Able to return to walking without minimal pain in 2 weeks - PRogressing  Able to ambulate independently without AD and minimal antalgia in 2 weeks - MET  Improve function as evidenced by FAAM score of 20% or less in 12 weeks (54% impairment initially) - Not met  Able to return to [employer] normal exercise routine in 12 weeks - Not met    Progress strengthening /stabilization /functional activity           Timed:         Manual Therapy:    10     mins  26306;     Therapeutic Exercise:    15     mins  03048;     Neuromuscular Mayte:    15    mins  56437;        Timed Treatment:   40   mins   Total Treatment:     40   mins    Pauly Triplett PTA  Physical Therapist Assistant License #[id number]